# Patient Record
Sex: MALE | Race: WHITE | NOT HISPANIC OR LATINO | Employment: PART TIME | URBAN - METROPOLITAN AREA
[De-identification: names, ages, dates, MRNs, and addresses within clinical notes are randomized per-mention and may not be internally consistent; named-entity substitution may affect disease eponyms.]

---

## 2019-01-05 PROCEDURE — 87491 CHLMYD TRACH DNA AMP PROBE: CPT | Performed by: FAMILY MEDICINE

## 2019-01-05 PROCEDURE — 87591 N.GONORRHOEAE DNA AMP PROB: CPT | Performed by: FAMILY MEDICINE

## 2019-01-09 ENCOUNTER — TELEPHONE (OUTPATIENT)
Dept: URGENT CARE | Facility: CLINIC | Age: 19
End: 2019-01-09

## 2019-01-24 ENCOUNTER — OFFICE VISIT (OUTPATIENT)
Dept: FAMILY MEDICINE CLINIC | Facility: CLINIC | Age: 19
End: 2019-01-24

## 2019-01-24 VITALS
DIASTOLIC BLOOD PRESSURE: 62 MMHG | HEIGHT: 72 IN | HEART RATE: 63 BPM | BODY MASS INDEX: 22.02 KG/M2 | WEIGHT: 162.6 LBS | OXYGEN SATURATION: 98 % | TEMPERATURE: 97.1 F | SYSTOLIC BLOOD PRESSURE: 122 MMHG

## 2019-01-24 DIAGNOSIS — N50.812 LEFT TESTICULAR PAIN: Primary | ICD-10-CM

## 2019-01-24 DIAGNOSIS — Z00.00 HEALTHCARE MAINTENANCE: ICD-10-CM

## 2019-01-24 LAB
ALBUMIN SERPL-MCNC: 5.09 G/DL (ref 3.2–4.8)
ALBUMIN/GLOB SERPL: 2.1 G/DL (ref 1.5–2.5)
ALP SERPL-CCNC: 79 U/L (ref 25–100)
ALT SERPL W P-5'-P-CCNC: 19 U/L (ref 7–40)
ANION GAP SERPL CALCULATED.3IONS-SCNC: 8 MMOL/L (ref 3–11)
AST SERPL-CCNC: 8 U/L (ref 0–33)
BASOPHILS # BLD AUTO: 0.02 10*3/MM3 (ref 0–0.2)
BASOPHILS NFR BLD AUTO: 0.3 % (ref 0–1)
BILIRUB SERPL-MCNC: 0.7 MG/DL (ref 0.3–1.2)
BUN BLD-MCNC: 18 MG/DL (ref 9–23)
BUN/CREAT SERPL: 17.1 (ref 7–25)
CALCIUM SPEC-SCNC: 9.9 MG/DL (ref 8.7–10.4)
CHLORIDE SERPL-SCNC: 104 MMOL/L (ref 99–109)
CO2 SERPL-SCNC: 30 MMOL/L (ref 20–31)
CREAT BLD-MCNC: 1.05 MG/DL (ref 0.6–1.3)
DEPRECATED RDW RBC AUTO: 37.8 FL (ref 37–54)
EOSINOPHIL # BLD AUTO: 0.13 10*3/MM3 (ref 0–0.3)
EOSINOPHIL NFR BLD AUTO: 1.7 % (ref 0–3)
ERYTHROCYTE [DISTWIDTH] IN BLOOD BY AUTOMATED COUNT: 11.8 % (ref 11.3–14.5)
GFR SERPL CREATININE-BSD FRML MDRD: 91 ML/MIN/1.73
GLOBULIN UR ELPH-MCNC: 2.4 GM/DL
GLUCOSE BLD-MCNC: 90 MG/DL (ref 70–100)
HCT VFR BLD AUTO: 46.2 % (ref 38.9–50.9)
HGB BLD-MCNC: 16.4 G/DL (ref 13.1–17.5)
IMM GRANULOCYTES # BLD AUTO: 0.01 10*3/MM3 (ref 0–0.03)
IMM GRANULOCYTES NFR BLD AUTO: 0.1 % (ref 0–0.6)
LYMPHOCYTES # BLD AUTO: 2.37 10*3/MM3 (ref 0.6–4.8)
LYMPHOCYTES NFR BLD AUTO: 30.8 % (ref 24–44)
MCH RBC QN AUTO: 31.5 PG (ref 27–31)
MCHC RBC AUTO-ENTMCNC: 35.5 G/DL (ref 32–36)
MCV RBC AUTO: 88.7 FL (ref 80–99)
MONOCYTES # BLD AUTO: 0.57 10*3/MM3 (ref 0–1)
MONOCYTES NFR BLD AUTO: 7.4 % (ref 0–12)
NEUTROPHILS # BLD AUTO: 4.61 10*3/MM3 (ref 1.5–8.3)
NEUTROPHILS NFR BLD AUTO: 59.8 % (ref 41–71)
PLATELET # BLD AUTO: 316 10*3/MM3 (ref 150–450)
PMV BLD AUTO: 11 FL (ref 6–12)
POTASSIUM BLD-SCNC: 4 MMOL/L (ref 3.5–5.5)
PROT SERPL-MCNC: 7.5 G/DL (ref 5.7–8.2)
RBC # BLD AUTO: 5.21 10*6/MM3 (ref 4.2–5.76)
SODIUM BLD-SCNC: 142 MMOL/L (ref 132–146)
TSH SERPL DL<=0.05 MIU/L-ACNC: 1.15 MIU/ML (ref 0.35–5.35)
WBC NRBC COR # BLD: 7.7 10*3/MM3 (ref 4.5–13.5)

## 2019-01-24 PROCEDURE — 99203 OFFICE O/P NEW LOW 30 MIN: CPT | Performed by: NURSE PRACTITIONER

## 2019-01-24 PROCEDURE — 80050 GENERAL HEALTH PANEL: CPT | Performed by: NURSE PRACTITIONER

## 2019-01-24 NOTE — PATIENT INSTRUCTIONS
Testicular Self-Exam  A self-exam of your testicles (testicular self-exam) is looking at and feeling your testicles for unusual lumps or swelling. Swelling, lumps, or pain can be caused by:  · Injuries.  · Puffiness, redness, and soreness (inflammation).  · Infection.  · Extra fluids around your testicle (hydrocele).  · Twisted testicles (testicular torsion).  · Cancer of the testicle (testicular cancer).    Why is it important to do a self-exam of testicles?  You may need to do self-exams if you are at risk for cancer of the testicles. You may be at risk if you have:  · A testicle that has not descended (cryptorchidism).  · A history of cancer of the testicle.  · A family history of cancer of the testicle.    How to do a self-exam of testicles  It is easiest to do a self-exam after a warm bath or shower. Testicles are harder to examine when you are cold.  A normal testicle is egg-shaped and feels firm. It is smooth, and it is not tender. At the back of your testicles, there is a firm cord that feels like spaghetti (spermatic cord).  Look and feel for changes  · Stand and hold your penis away from your body.  · Look at each testicle to check for lumps or swelling.  · Roll each testicle between your thumb and finger. Feel the whole testicle. Feel for:  ? Lumps.  ? Swelling.  ? Discomfort.  · Check for swelling or tender bumps in the groin area. Your groin is where your lower belly (abdomen) meets your upper thighs.  Contact a health care provider if:  · You find a bump or lump. This may be like a small, hard bump that is the size of a pea.  · You find swelling.  · You find pain.  · You find soreness.  · You see or feel any other changes.  Summary  · A self-exam of your testicles is looking at and feeling your testicles for lumps or swelling.  · You may need to do self-exams if you are at risk for cancer of the testicle.  · You should check each of your testicles for lumps, swelling, or discomfort.  · You should check  for swelling or tender bumps in the groin area. Your groin is where your lower belly (abdomen) meets your upper thighs.  This information is not intended to replace advice given to you by your health care provider. Make sure you discuss any questions you have with your health care provider.  Document Released: 03/16/2010 Document Revised: 11/13/2017 Document Reviewed: 11/13/2017  Elsevier Interactive Patient Education © 2017 [employer]

## 2019-01-24 NOTE — PROGRESS NOTES
Subjective   Vamshi August is a 18 y.o. male here to establish care.  Chief Complaint   Patient presents with   • Establish Care   • Testicle Pain     Noticed around the first of January.       History of Present Illness   Patient is here with complaint of left testicular pain, was seen at  on 1/5/19 and 1/14/19, treated with antibiotics for possible STD. Pain has resolved, but when it started 1st of January, woke up one morning with discomfort, mild tenderness. States he does do self testicular exams, has not noticed any nodules. He is still concerned that something might be wrong. He denies intercourse in past 3 months, states he has been with same person for 3 years, uses condoms and does not think this was an STI. States no physical exam done at those visits.  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 Is from [place].   The following portions of the patient's history were reviewed and updated as appropriate: allergies, current medications, past family history, past medical history, past social history, past surgical history and problem list.    Review of Systems   Constitutional: Negative for activity change, appetite change, chills, fatigue and fever.   HENT: Negative for sore throat and trouble swallowing.    Eyes: Negative for photophobia, pain, redness, itching and visual disturbance.   Respiratory: Negative for cough, shortness of breath and wheezing.    Cardiovascular: Negative for chest pain, palpitations and leg swelling.   Gastrointestinal: Negative for abdominal distention, abdominal pain, blood in stool, constipation, diarrhea, nausea and vomiting.   Genitourinary: Positive for testicular pain. Negative for difficulty urinating, discharge and dysuria.   Musculoskeletal: Negative for arthralgias, back pain, myalgias, neck pain and neck stiffness.   Skin: Negative for rash and wound.   Neurological: Negative for dizziness and headaches.  "  Psychiatric/Behavioral: Negative for dysphoric mood. The patient is not nervous/anxious.      Blood pressure 122/62, pulse 63, temperature 97.1 °F (36.2 °C), temperature source Temporal, height 182 cm (71.65\"), weight 73.8 kg (162 lb 9.6 oz), SpO2 98 %.    No Known Allergies  History reviewed. No pertinent past medical history.  Past Surgical History:   Procedure Laterality Date   • DENTAL PROCEDURE       Family History   Problem Relation Age of Onset   • No Known Problems Mother    • No Known Problems Father    • No Known Problems Sister    • No Known Problems Brother      Social History     Socioeconomic History   • Marital status: Single     Spouse name: Not on file   • Number of children: Not on file   • Years of education: Not on file   • Highest education level: Not on file   Social Needs   • Financial resource strain: Not on file   • Food insecurity - worry: Not on file   • Food insecurity - inability: Not on file   • Transportation needs - medical: Not on file   • Transportation needs - non-medical: Not on file   Occupational History   • Not on file   Tobacco Use   • Smoking status: Never Smoker   • Smokeless tobacco: Never Used   Substance and Sexual Activity   • Alcohol use: Yes     Frequency: 2-4 times a month     Drinks per session: 3 or 4     Binge frequency: Never   • Drug use: No   • Sexual activity: Not Currently   Other Topics Concern   • Not on file   Social History Narrative   • Not on file       There is no immunization history on file for this patient.  No current outpatient medications on file.    Objective   Physical Exam   Constitutional: He is oriented to person, place, and time. He appears well-developed and well-nourished. No distress.   HENT:   Head: Normocephalic and atraumatic.   Right Ear: External ear normal.   Left Ear: External ear normal.   Eyes: Conjunctivae are normal. No scleral icterus.   Cardiovascular: Normal rate, regular rhythm, normal heart sounds and intact distal " pulses.   No murmur heard.  Pulmonary/Chest: Effort normal and breath sounds normal. No stridor. No respiratory distress. He has no wheezes. He has no rales.   Abdominal: Soft.   Genitourinary: Penis normal. Right testis shows no mass, no swelling and no tenderness. Right testis is descended. Left testis shows no mass, no swelling and no tenderness. Left testis is descended.   Musculoskeletal: He exhibits no edema or tenderness.   Lymphadenopathy:     He has no cervical adenopathy.   Neurological: He is alert and oriented to person, place, and time.   Skin: Skin is warm and dry. He is not diaphoretic.   Psychiatric: He has a normal mood and affect. His behavior is normal. Thought content normal.   Vitals reviewed.      Assessment/Plan   Vamshi was seen today for establish care and testicle pain.    Diagnoses and all orders for this visit:    Left testicular pain  -     US Scrotum & Testicles; Future  -     CBC Auto Differential; Future  -     CBC Auto Differential    Healthcare maintenance  -     CBC Auto Differential; Future  -     Comprehensive Metabolic Panel; Future  -     TSH; Future  -     CBC Auto Differential  -     Comprehensive Metabolic Panel  -     TSH      No orders of the defined types were placed in this encounter.    Physical exam was normal, will send for ultrasound to evaluate for possible hernia or other cause of pain since patient has concerns. Advised of reasons to present to the ED, including severe testicular pain or swelling.  Provided printed information today regarding safe sex practices and self testicular exams  Screening labs ordered to evaluate for infection and chronic conditions.  Patient was encouraged to keep me informed of any acute changes, lack of improvement, or any new concerning symptoms.Patient voiced understanding of all instructions and denied further questions.

## 2019-01-25 ENCOUNTER — HOSPITAL ENCOUNTER (OUTPATIENT)
Dept: ULTRASOUND IMAGING | Facility: HOSPITAL | Age: 19
Discharge: HOME | End: 2019-01-25
Admitting: NURSE PRACTITIONER

## 2019-01-25 DIAGNOSIS — N50.812 LEFT TESTICULAR PAIN: ICD-10-CM

## 2019-01-25 PROCEDURE — 76870 US EXAM SCROTUM: CPT

## 2019-01-30 ENCOUNTER — TELEPHONE (OUTPATIENT)
Dept: FAMILY MEDICINE CLINIC | Facility: CLINIC | Age: 19
End: 2019-01-30

## 2019-01-30 NOTE — TELEPHONE ENCOUNTER
Patient notified.     He was still very concerned and wants to know if you have any idea what causes this and if there are any recommendations to prevent this from happening again?

## 2019-01-30 NOTE — TELEPHONE ENCOUNTER
Let patient know this is fairly common, should resolve on it's own, not clear what causes it. Hydroceles and spermatoceles are usually painless and may not have anything to do with his pain, (that had resolved before his office visit) In general any young male who develops severe testicular pain should go to the ER, however this has nothing to do with the cysts. The concern in those cases would be torsion or twisting of the testicles. There is no sign of cancer and he should not be concerned about the US.

## 2019-01-30 NOTE — TELEPHONE ENCOUNTER
CHECKING ON THE RESULTS OF AN ULTRASOUND ON TESTICLES. THIS IS THIRD DAY SINCE TEST DONE. ULTRASOUND TECH ASKED THAT HE CALL US TODAY.    PLEASE ADVISE

## 2019-01-30 NOTE — TELEPHONE ENCOUNTER
See letter dated 1/27/19.    No concerning findings on your ultrasound. Hydroceles usually resolve on their own and are fluid filled cysts. If you develop swelling or pain please follow up or as we discussed if pain is severe go to the ED.   If you have any questions or concerns, please don't hesitate to call.

## 2019-02-08 ENCOUNTER — OFFICE VISIT (OUTPATIENT)
Dept: FAMILY MEDICINE CLINIC | Facility: CLINIC | Age: 19
End: 2019-02-08

## 2019-02-08 VITALS
HEART RATE: 84 BPM | OXYGEN SATURATION: 95 % | BODY MASS INDEX: 22.27 KG/M2 | DIASTOLIC BLOOD PRESSURE: 74 MMHG | TEMPERATURE: 98.3 F | WEIGHT: 162.6 LBS | SYSTOLIC BLOOD PRESSURE: 116 MMHG

## 2019-02-08 DIAGNOSIS — N50.819 TESTICLE PAIN: Primary | ICD-10-CM

## 2019-02-08 DIAGNOSIS — J30.2 SEASONAL ALLERGIES: ICD-10-CM

## 2019-02-08 DIAGNOSIS — R06.2 WHEEZING: ICD-10-CM

## 2019-02-08 DIAGNOSIS — J01.00 ACUTE NON-RECURRENT MAXILLARY SINUSITIS: ICD-10-CM

## 2019-02-08 PROCEDURE — 99214 OFFICE O/P EST MOD 30 MIN: CPT | Performed by: NURSE PRACTITIONER

## 2019-02-08 RX ORDER — PREDNISONE 20 MG/1
20 TABLET ORAL DAILY
Qty: 5 TABLET | Refills: 0 | Status: SHIPPED | OUTPATIENT
Start: 2019-02-08 | End: 2019-02-13

## 2019-02-08 RX ORDER — LORATADINE 10 MG/1
10 TABLET ORAL DAILY
Qty: 30 TABLET | Refills: 5 | Status: SHIPPED | OUTPATIENT
Start: 2019-02-08 | End: 2019-10-09

## 2019-02-08 RX ORDER — AMOXICILLIN AND CLAVULANATE POTASSIUM 875; 125 MG/1; MG/1
1 TABLET, FILM COATED ORAL 2 TIMES DAILY
Qty: 14 TABLET | Refills: 0 | Status: SHIPPED | OUTPATIENT
Start: 2019-02-08 | End: 2019-02-15

## 2019-02-08 RX ORDER — FLUTICASONE PROPIONATE 50 MCG
2 SPRAY, SUSPENSION (ML) NASAL DAILY
Qty: 1 BOTTLE | Refills: 5 | Status: SHIPPED | OUTPATIENT
Start: 2019-02-08 | End: 2019-10-09

## 2019-02-08 NOTE — PROGRESS NOTES
"Subjective   Vamshi August is a 18 y.o. male.   Chief Complaint   Patient presents with   • URI     Started Wednesday   • Testicle Pain     Follow-up (pain goes away with motrin)       History of Present Illness   Patient is here for follow up for testicular pain, had normal US, still occasional pain, resolved with motrin, no swelling, occ. \"a little redness\".   Has new complaint of URI, sore throat started Tuesday, has gotten worse, congestion started Wednesday, woke up this morning with right ear pain, pressure and decreased hearing. States in February allergies cause problems.   The following portions of the patient's history were reviewed and updated as appropriate: allergies and current medications.    Review of Systems   Constitutional: Negative for chills and fever.   HENT: Positive for congestion, ear pain, hearing loss (right ear this morning), postnasal drip, rhinorrhea, sinus pressure, sore throat and voice change. Negative for sneezing and trouble swallowing.    Eyes: Positive for itching.   Respiratory: Positive for cough.    Allergic/Immunologic: Positive for environmental allergies.   Neurological: Negative for dizziness and headaches.       Objective   Physical Exam   Constitutional: He is oriented to person, place, and time. He appears well-developed and well-nourished. No distress.   HENT:   Head: Normocephalic and atraumatic.   Right Ear: External ear normal. Tympanic membrane is not erythematous and not bulging. A middle ear effusion is present.   Left Ear: External ear normal. Tympanic membrane is not erythematous and not bulging. A middle ear effusion is present.   Nose: Mucosal edema present. Right sinus exhibits maxillary sinus tenderness. Right sinus exhibits no frontal sinus tenderness. Left sinus exhibits maxillary sinus tenderness. Left sinus exhibits no frontal sinus tenderness.   Mouth/Throat: No oropharyngeal exudate.   Eyes: Conjunctivae are normal. No scleral icterus. "   Cardiovascular: Normal rate and regular rhythm.   No murmur heard.  Pulmonary/Chest: Effort normal. No stridor. No respiratory distress. He has wheezes (left lower lobe). He has no rales.   Genitourinary:   Genitourinary Comments: deferred   Musculoskeletal: He exhibits no edema or tenderness.   Lymphadenopathy:     He has no cervical adenopathy.   Neurological: He is alert and oriented to person, place, and time.   Skin: Skin is warm. Capillary refill takes less than 2 seconds. He is not diaphoretic.   Psychiatric: He has a normal mood and affect. His behavior is normal.   Vitals reviewed.      Assessment/Plan   Vamshi was seen today for uri and testicle pain.    Diagnoses and all orders for this visit:    Testicle pain    Wheezing  -     predniSONE (DELTASONE) 20 MG tablet; Take 1 tablet by mouth Daily for 5 days.    Acute non-recurrent maxillary sinusitis  -     amoxicillin-clavulanate (AUGMENTIN) 875-125 MG per tablet; Take 1 tablet by mouth 2 (Two) Times a Day for 7 days.  -     fluticasone (FLONASE ALLERGY RELIEF) 50 MCG/ACT nasal spray; 2 sprays into the nostril(s) as directed by provider Daily.    Seasonal allergies  -     loratadine (CLARITIN) 10 MG tablet; Take 1 tablet by mouth Daily.  -     fluticasone (FLONASE ALLERGY RELIEF) 50 MCG/ACT nasal spray; 2 sprays into the nostril(s) as directed by provider Daily.      Testicular pain has resolved, provided printed information for spermatoceles.  Prednisone prescribed for wheezing  Augmentin prescribed for sinus infection.  Advised daily antihistamine such as Claritin or Zyrtec, along with daily Flonase to reduce drainage and allergy symptoms.  Advised patient to start this preventative treatment every year at least one month before symptoms normally occur to reduce risk of sinus infections.  Patient was encouraged to keep me informed of any acute changes, lack of improvement, or any new concerning symptoms.  Patient voiced understanding of all  instructions and denied further questions.

## 2019-02-08 NOTE — PATIENT INSTRUCTIONS
Allergic Rhinitis, Adult  Allergic rhinitis is an allergic reaction that affects the mucous membrane inside the nose. It causes sneezing, a runny or stuffy nose, and the feeling of mucus going down the back of the throat (postnasal drip). Allergic rhinitis can be mild to severe.  There are two types of allergic rhinitis:  · Seasonal. This type is also called hay fever. It happens only during certain seasons.  · Perennial. This type can happen at any time of the year.    What are the causes?  This condition happens when the body's defense system (immune system) responds to certain harmless substances called allergens as though they were germs.   Seasonal allergic rhinitis is triggered by pollen, which can come from grasses, trees, and weeds. Perennial allergic rhinitis may be caused by:  · House dust mites.  · Pet dander.  · Mold spores.    What are the signs or symptoms?  Symptoms of this condition include:  · Sneezing.  · Runny or stuffy nose (nasal congestion).  · Postnasal drip.  · Itchy nose.  · Tearing of the eyes.  · Trouble sleeping.  · Daytime sleepiness.    How is this diagnosed?  This condition may be diagnosed based on:  · Your medical history.  · A physical exam.  · Tests to check for related conditions, such as:  ? Asthma.  ? Pink eye.  ? Ear infection.  ? Upper respiratory infection.  · Tests to find out which allergens trigger your symptoms. These may include skin or blood tests.    How is this treated?  There is no cure for this condition, but treatment can help control symptoms. Treatment may include:  · Taking medicines that block allergy symptoms, such as antihistamines. Medicine may be given as a shot, nasal spray, or pill.  · Avoiding the allergen.  · Desensitization. This treatment involves getting ongoing shots until your body becomes less sensitive to the allergen. This treatment may be done if other treatments do not help.  · If taking medicine and avoiding the allergen does not work, new,  stronger medicines may be prescribed.    Follow these instructions at home:  · Find out what you are allergic to. Common allergens include smoke, dust, and pollen.  · Avoid the things you are allergic to. These are some things you can do to help avoid allergens:  ? Replace carpet with wood, tile, or vinyl sj. Carpet can trap dander and dust.  ? Do not smoke. Do not allow smoking in your home.  ? Change your heating and air conditioning filter at least once a month.  ? During allergy season:  § Keep windows closed as much as possible.  § Plan outdoor activities when pollen counts are lowest. This is usually during the evening hours.  § When coming indoors, change clothing and shower before sitting on furniture or bedding.  · Take over-the-counter and prescription medicines only as told by your health care provider.  · Keep all follow-up visits as told by your health care provider. This is important.  Contact a health care provider if:  · You have a fever.  · You develop a persistent cough.  · You make whistling sounds when you breathe (you wheeze).  · Your symptoms interfere with your normal daily activities.  Get help right away if:  · You have shortness of breath.  Summary  · This condition can be managed by taking medicines as directed and avoiding allergens.  · Contact your health care provider if you develop a persistent cough or fever.  · During allergy season, keep windows closed as much as possible.  This information is not intended to replace advice given to you by your health care provider. Make sure you discuss any questions you have with your health care provider.  Document Released: 09/12/2002 Document Revised: 01/25/2018 Document Reviewed: 01/25/2018  Elsevier Interactive Patient Education © 2018 [employer]    Sinusitis, Adult  Sinusitis is soreness and inflammation of your sinuses. Sinuses are hollow spaces in the bones around your face. They are located:  · Around your eyes.  · In the middle  of your forehead.  · Behind your nose.  · In your cheekbones.    Your sinuses and nasal passages are lined with a stringy fluid (mucus). Mucus normally drains out of your sinuses. When your nasal tissues get inflamed or swollen, the mucus can get trapped or blocked so air cannot flow through your sinuses. This lets bacteria, viruses, and funguses grow, and that leads to infection.  Follow these instructions at home:  Medicines  · Take, use, or apply over-the-counter and prescription medicines only as told by your doctor. These may include nasal sprays.  · If you were prescribed an antibiotic medicine, take it as told by your doctor. Do not stop taking the antibiotic even if you start to feel better.  Hydrate and Humidify  · Drink enough water to keep your pee (urine) clear or pale yellow.  · Use a cool mist humidifier to keep the humidity level in your home above 50%.  · Breathe in steam for 10-15 minutes, 3-4 times a day or as told by your doctor. You can do this in the bathroom while a hot shower is running.  · Try not to spend time in cool or dry air.  Rest  · Rest as much as possible.  · Sleep with your head raised (elevated).  · Make sure to get enough sleep each night.  General instructions  · Put a warm, moist washcloth on your face 3-4 times a day or as told by your doctor. This will help with discomfort.  · Wash your hands often with soap and water. If there is no soap and water, use hand .  · Do not smoke. Avoid being around people who are smoking (secondhand smoke).  · Keep all follow-up visits as told by your doctor. This is important.  Contact a doctor if:  · You have a fever.  · Your symptoms get worse.  · Your symptoms do not get better within 10 days.  Get help right away if:  · You have a very bad headache.  · You cannot stop throwing up (vomiting).  · You have pain or swelling around your face or eyes.  · You have trouble seeing.  · You feel confused.  · Your neck is stiff.  · You have  trouble breathing.  This information is not intended to replace advice given to you by your health care provider. Make sure you discuss any questions you have with your health care provider.  Document Released: 06/05/2009 Document Revised: 08/13/2017 Document Reviewed: 10/12/2016  [employer] Interactive Patient Education © 2018 [employer]    Spermatocele  A spermatocele is a fluid-filled sac (cyst) inside the scrotum. This type of cyst often forms at the top of the testicle where sperm is stored (epididymis). The cyst sometimes forms along the tube that carries sperm away from the epididymis (vas deferens).  Spermatoceles are usually painless. Most cysts are small, but they can grow larger. Spermatoceles are not cancerous (are benign).  What are the causes?  The cause of this condition is not known.  What are the signs or symptoms?  In most cases, small cysts do not cause symptoms. If you do have symptoms, they may include:  · Dull pain.  · A feeling of heaviness.  · An enlargement of your scrotum, if the cyst is large.    How is this diagnosed?  This condition is diagnosed based on a physical exam. You or your health care provider may notice the cyst when feeling your scrotum. Your provider may shine a light through (transilluminate) your scrotum to see if light will pass through the cyst. You may also have an ultrasound of your scrotum to rule out a tumor.  How is this treated?  Small spermatoceles do not need to be treated. If the spermatocele has grown large or is uncomfortable, surgery to remove the cyst may be recommended.  Follow these instructions at home:  · Watch your spermatocele for any changes.  · Keep all follow-up visits as told by your health care provider. This is important.  Contact a health care provider if:  · Your spermatocele gets larger.  · You have pain in your scrotum.  · Your spermatocele comes back after treatment.  This information is not intended to replace advice given to you by your  health care provider. Make sure you discuss any questions you have with your health care provider.  Document Released: 04/10/2017 Document Revised: 08/14/2017 Document Reviewed: 01/01/2017  [employer] Interactive Patient Education © 2018 [employer]

## 2019-10-08 ENCOUNTER — OFFICE VISIT (OUTPATIENT)
Dept: FAMILY MEDICINE CLINIC | Facility: CLINIC | Age: 19
End: 2019-10-08

## 2019-10-08 DIAGNOSIS — N50.89 TESTICULAR LUMP: Primary | ICD-10-CM

## 2019-10-08 DIAGNOSIS — N43.40 SPERMATOCELE OF EPIDIDYMIS: ICD-10-CM

## 2019-10-08 PROCEDURE — 99213 OFFICE O/P EST LOW 20 MIN: CPT | Performed by: NURSE PRACTITIONER

## 2019-10-08 NOTE — PATIENT INSTRUCTIONS
Spermatocele    A spermatocele is a fluid-filled sac (cyst) inside the scrotum. This type of cyst often forms at the top of the testicle where sperm is stored (epididymis). The cyst sometimes forms along the tube that carries sperm away from the epididymis (vas deferens).  Spermatoceles are usually painless. Most cysts are small, but they can grow larger. Spermatoceles are not cancerous (are benign).  What are the causes?  The cause of this condition is not known.  What are the signs or symptoms?  In most cases, small cysts do not cause symptoms. If you do have symptoms, they may include:  · Dull pain.  · A feeling of heaviness.  · An enlargement of your scrotum, if the cyst is large.  How is this diagnosed?  This condition is diagnosed based on a physical exam. You or your health care provider may notice the cyst when feeling your scrotum. Your provider may shine a light through (transilluminate) your scrotum to see if light will pass through the cyst. You may also have an ultrasound of your scrotum to rule out a tumor.  How is this treated?  Small spermatoceles do not need to be treated. If the spermatocele has grown large or is uncomfortable, surgery to remove the cyst may be recommended.  Follow these instructions at home:  · Watch your spermatocele for any changes.  · Keep all follow-up visits as told by your health care provider. This is important.  Contact a health care provider if:  · Your spermatocele gets larger.  · You have pain in your scrotum.  · Your spermatocele comes back after treatment.  This information is not intended to replace advice given to you by your health care provider. Make sure you discuss any questions you have with your health care provider.  Document Released: 04/10/2017 Document Revised: 08/14/2017 Document Reviewed: 01/01/2017  [employer] Interactive Patient Education © 2019 [employer]    Scrotal Swelling  Scrotal swelling refers to a condition in which the sac of skin that  contains the testes (scrotum) is enlarged or swollen. Many things can cause the scrotum to enlarge or swell, including:  · Fluid around the testicle (hydrocele).  · A weakened area in the muscles around the groin (hernia).  · An enlarged vein around the testicle (varicocele).  · An injury.  · An infection.  · Certain medical treatments.  · Certain medical conditions, such as congestive heart failure.  · A recent genital surgery or procedure.  · A twisting of the spermatic cord that cuts off blood supply (testicular torsion).  · Testicular cancer.  Scrotal swelling can happen along with scrotal pain.  Follow these instructions at home:  · Until the swelling goes away:  ? Rest. The best position to rest in is to lie down.  ? Limit activity.  · Put ice on the scrotum:  ? Put ice in a plastic bag.  ? Place a towel between your skin and the bag.  ? Leave the ice on for 20 minutes, 2-3 times a day for 1-2 days.  · Place a rolled towel under your testicles for support.  · Wear loose-fitting clothing or an athletic support cup for comfort.  · Take over-the-counter and prescription medicines only as told by your health care provider.  · Perform a monthly self-exam of the scrotum and penis. Feel for changes. Ask your health care provider how to perform a monthly self-exam if you are unsure.  Contact a health care provider if:  · You have a sudden pain that is persistent and does not improve.  · You have a heavy feeling or notice fluid in the scrotum.  · You have pain or burning while urinating.  · You have blood in your urine or semen.  · You feel a lump around the testicle.  · You notice that one testicle is larger than the other. Keep in mind that a small difference in size is normal.  · You have a persistent dull ache or pain in your groin or scrotum.  Get help right away if:  · The pain does not go away.  · The pain becomes severe.  · You have a fever or chills.  · You have pain or vomiting that cannot be  controlled.  · One or both sides of the scrotum are very red and swollen.  · There is redness spreading upward from your scrotum to your abdomen or downward from your scrotum to your thighs.  Summary  · Scrotal swelling refers to a condition in which the sac of skin that contains the testes (scrotum) is enlarged.  · Many things can cause the scrotum to swell, including hydrocele, a hernia, and a varicocele.  · Limiting activity and icing the scrotum may help reduce swelling and pain.  · Contact your health care provider if you develop scrotal pain that is sudden and persistent, or if you have pain while urinating. Do this also if you feel a lump around the testicle or notice blood in your urine or semen.  · Get help right away for uncontrolled pain or vomiting, for very red and swollen scrotum, or for fever or chills.  This information is not intended to replace advice given to you by your health care provider. Make sure you discuss any questions you have with your health care provider.  Document Released: 01/20/2012 Document Revised: 03/05/2018 Document Reviewed: 03/05/2018  [employer] Interactive Patient Education © 2019 [employer]    Preventing Sexually Transmitted Infections, Adult  Sexually transmitted infections (STIs) are diseases that are passed (transmitted) from person to person through bodily fluids exchanged during sex or sexual contact. Bodily fluids include saliva, semen, blood, vaginal mucus, and urine. You may have an increased risk for developing an STI if you have unprotected oral, vaginal, or anal sex.  Some common STIs include:  · Herpes.  · Hepatitis B.  · Chlamydia.  · Gonorrhea.  · Syphilis.  · HPV (human papillomavirus).  · HIV (humanimmunodeficiency virus), the virus that can cause AIDS (acquired immunodeficiency virus).  How can I protect myself from sexually transmitted infections?  The only way to completely prevent STIs is not to have sex of any kind (practice abstinence). This includes  oral, vaginal, or anal sex. If you are sexually active, take these actions to lower your risk of getting an STI:  · Have only one sex partner (be monogamous) or limit the number of sexual partners you have.  · Stay up-to-date on immunizations. Certain vaccines can lower your risk of getting certain STIs, such as:  ? Hepatitis A and B vaccines. You may have been vaccinated as a young child, but likely need a booster shot as a teen or young adult.  ? HPV vaccine. This vaccine is recommended if you are a man under age 22 or a woman under age 27.  · Use methods that prevent the exchange of body fluids between partners (barrier protection) every time you have sex. Barrier protection can be used during oral, vaginal, or anal sex. Commonly used barrier methods include:  ? Male condom.  ? Female condom.  ? Dental dam.  · Get tested regularly for STIs. Have your sexual partner get tested regularly as well.  · Avoid mixing alcohol, drugs, and sex. Alcohol and drug use can affect your ability to make good decisions and can lead to risky sexual behaviors.  · Ask your health care provider about taking pre-exposure prophylaxis (PrEP) to prevent HIV infection if you:  ? Have a HIV-positive sexual partner.  ? Have multiple sexual partners or partners who do not know their HIV status, and do not regularly use a condom during sex.  ? Use injection drugs and share needles.  Birth control pills, injections, implants, and intrauterine devices (IUDs) do not protect against STIs. To prevent both STIs and pregnancy, always use a condom with another form of birth control.  Some STIs, such as herpes, are spread through skin to skin contact. A condom does not protect you from getting such STIs. If you or your partner have herpes and there is an active flare with open sores, avoid all sexual contact.  Why are these changes important?  Taking steps to practice safe sex protects you and others. Many STIs can be cured. However, some STIs are not  curable and will affect you for the rest of your life. STIs can be passed on to another person even if you do not have symptoms.  What can happen if changes are not made?  Certain STIs may:  · Require you to take medicine for the rest of your life.  · Affect your ability to have children (your fertility).  · Increase your risk for developing another STI or certain serious health conditions, such as:  ? Cervical cancer.  ? Head and neck cancer.  ? Pelvic inflammatory disease (PID) in women.  ? Organ damage or damage to other parts of your body, if the infection spreads.  · Be passed to a baby during childbirth.  How are sexually transmitted infections treated?  If you or your partner know or think that you may have an STI:  · Talk with your healthcare provider about what can be done to treat it. Some STIs can be treated and cured with medicines.  · For curable STIs, you and your partner should avoid sex during treatment and for several days after treatment is complete.  · You and your partner should both be treated at the same time, if there is any chance that your partner is infected as well. If you get treatment but your partner does not, your partner can re-infect you when you resume sexual contact.  · Do not have unprotected sex.  Where to find more information  Learn more about sexually transmitted diseases and infections from:  · Centers for Disease Control and Prevention:  ? More information about specific STIs: www.cdc.gov/std  ? Find places to get sexual health counseling and treatment for free or for a low cost: gettested.cdc.gov  · [place]. Department of Health and Human Services: www.womenshealth.gov/publications/our-publications/fact-sheet/sexually-transmitted-infections.html  Summary  · The only way to completely prevent STIs is not to have sex (practice abstinence), including oral, vaginal, or anal sex.  · STIs can spread through saliva, semen, blood, vaginal mucus, urine, or sexual contact.  · If you do  have sex, limit your number of sexual partners and use a barrier protection method every time you have sex.  · If you develop an STI, get treated right away and ask your partner to be treated as well. Do not resume having sex until both of you have completed treatment for the STI.  This information is not intended to replace advice given to you by your health care provider. Make sure you discuss any questions you have with your health care provider.  Document Released: 12/14/2017 Document Revised: 12/14/2017 Document Reviewed: 12/14/2017  [employer] Interactive Patient Education © 2019 [employer]    Safe Sex  Practicing safe sex means taking steps before and during sex to reduce your risk of:  · Getting an STD (sexually transmitted disease).  · Giving your partner an STD.  · Unwanted pregnancy.  How can I practice safe sex?  To practice safe sex:  · Limit your sexual partners to only one partner who is having sex with only you.  · Avoid using alcohol and recreational drugs before having sex. These substances can affect your judgment.  · Before having sex with a new partner:  ? Talk to your partner about past partners, past STDs, and drug use.  ? You and your partner should be screened for STDs and discuss the results with each other.  · Check your body regularly for sores, blisters, rashes, or unusual discharge. If you notice any of these problems, visit your health care provider.  · If you have symptoms of an infection or you are being treated for an STD, avoid sexual contact.  · While having sex, use a condom. Make sure to:  ? Use a condom every time you have vaginal, oral, or anal sex. Both females and males should wear condoms during oral sex.  ? Keep condoms in place from the beginning to the end of sexual activity.  ? Use a latex condom, if possible. Latex condoms offer the best protection.  ? Use only water-based lubricants or oils to lubricate a condom. Using petroleum-based lubricants or oils will weaken  the condom and increase the chance that it will break.  · See your health care provider for regular screenings, exams, and tests for STDs.  · Talk with your health care provider about the form of birth control (contraception) that is best for you.  · Get vaccinated against hepatitis B and human papillomavirus (HPV).  · If you are at risk of being infected with HIV (human immunodeficiency virus), talk with your health care provider about taking a prescription medicine to prevent HIV infection. You are considered at risk for HIV if:  ? You are a man who has sex with other men.  ? You are a heterosexual man or woman who is sexually active with more than one partner.  ? You take drugs by injection.  ? You are sexually active with a partner who has HIV.  This information is not intended to replace advice given to you by your health care provider. Make sure you discuss any questions you have with your health care provider.  Document Released: 01/25/2006 Document Revised: 05/03/2017 Document Reviewed: 11/06/2016  [employer] Interactive Patient Education © 2019 [employer]

## 2019-10-08 NOTE — PROGRESS NOTES
Subjective   Vamshi August is a 18 y.o. male.   Chief Complaint   Patient presents with   • Lump on left testicle     Tightness in left side of lower abdomen       History of Present Illness   Patient is here with complaint of a lump on left testicle, lump not painful, but pain in left scrotum. No change in size since first noticed 2 weeks ago., some improvement in scrotal pain. Denies unprotected sex. He was seen in Feb 2019, for left testicular pain without nodule, had US that showed a spermatocele. Pain resolved without treatment. Negative STI screening.  The following portions of the patient's history were reviewed and updated as appropriate: allergies, current medications, past social history and problem list.    Review of Systems   Constitutional: Negative for chills and fever.   Gastrointestinal: Negative for abdominal pain, constipation and diarrhea.   Genitourinary: Positive for scrotal swelling and testicular pain. Negative for difficulty urinating, discharge, dysuria and genital sores.       Objective   Physical Exam   Constitutional: He is oriented to person, place, and time. He appears well-developed and well-nourished. No distress.   HENT:   Head: Normocephalic and atraumatic.   Right Ear: External ear normal.   Left Ear: External ear normal.   Mouth/Throat: No oropharyngeal exudate.   Eyes: Conjunctivae are normal. No scleral icterus.   Cardiovascular: Normal rate, regular rhythm and normal heart sounds.   No murmur heard.  Pulmonary/Chest: Effort normal and breath sounds normal. No stridor. No respiratory distress. He has no wheezes.   Abdominal: Soft. Bowel sounds are normal. He exhibits no distension. There is no tenderness. There is no guarding.   Genitourinary: Penis normal. Right testis shows no mass, no swelling and no tenderness. Right testis is descended. Left testis shows mass. Left testis shows no swelling and no tenderness. Left testis is descended. Circumcised.    Neurological: He is alert and oriented to person, place, and time.   Skin: Skin is warm and dry. He is not diaphoretic.   Psychiatric: He has a normal mood and affect. His behavior is normal. Judgment and thought content normal.   Vitals reviewed.      Assessment/Plan   Vamshi was seen today for lump on left testicle.    Diagnoses and all orders for this visit:    Testicular lump  -     Ambulatory Referral to Urology  -     US Scrotum & Testicles; Future    Spermatocele of epididymis        Referred to urology for evaluation of spermatocele and testicular nodule. US ordered. Provided safe sex information, advised use of condoms. Patient advised to seek emergent care if he develops worsening testicular pain or swelling.  Patient was encouraged to keep me informed of any acute changes, lack of improvement, or any new concerning symptoms.  Patient voiced understanding of all instructions and denied further questions.

## 2019-10-09 VITALS
OXYGEN SATURATION: 99 % | HEART RATE: 56 BPM | DIASTOLIC BLOOD PRESSURE: 68 MMHG | BODY MASS INDEX: 23.84 KG/M2 | SYSTOLIC BLOOD PRESSURE: 120 MMHG | HEIGHT: 72 IN | WEIGHT: 176 LBS

## 2019-10-18 ENCOUNTER — APPOINTMENT (OUTPATIENT)
Dept: ULTRASOUND IMAGING | Facility: HOSPITAL | Age: 19
End: 2019-10-18

## 2019-10-22 ENCOUNTER — HOSPITAL ENCOUNTER (OUTPATIENT)
Dept: ULTRASOUND IMAGING | Facility: HOSPITAL | Age: 19
Discharge: HOME | End: 2019-10-22
Admitting: NURSE PRACTITIONER

## 2019-10-22 DIAGNOSIS — N50.89 TESTICULAR LUMP: ICD-10-CM

## 2019-10-22 PROCEDURE — 76870 US EXAM SCROTUM: CPT

## 2019-11-07 ENCOUNTER — OFFICE VISIT (OUTPATIENT)
Dept: FAMILY MEDICINE CLINIC | Facility: CLINIC | Age: 19
End: 2019-11-07

## 2019-11-07 VITALS
SYSTOLIC BLOOD PRESSURE: 108 MMHG | OXYGEN SATURATION: 98 % | HEIGHT: 72 IN | DIASTOLIC BLOOD PRESSURE: 80 MMHG | BODY MASS INDEX: 23.16 KG/M2 | WEIGHT: 171 LBS | HEART RATE: 67 BPM

## 2019-11-07 DIAGNOSIS — N50.89 TESTICULAR NODULE: Primary | ICD-10-CM

## 2019-11-07 DIAGNOSIS — N43.3 LEFT HYDROCELE: ICD-10-CM

## 2019-11-07 DIAGNOSIS — J00 ACUTE NASOPHARYNGITIS: ICD-10-CM

## 2019-11-07 PROCEDURE — 99212 OFFICE O/P EST SF 10 MIN: CPT | Performed by: NURSE PRACTITIONER

## 2019-11-07 NOTE — PROGRESS NOTES
Subjective   Vamshi August is a 18 y.o. male.   Chief Complaint   Patient presents with   • Testicle Pain     follow-up feeling better       History of Present Illness   Patient is here for follow up for testicular lump and pain, states nodule has decreased in size, denies any pain. This was a recurrence, was treated with azithromycin and rocephin in March, 2019 He has an appt with a urologist in [place] today, wants to change that to a Dr. In [place].  States he has a cold now x 2 days, so did feel feverish yesterday. OTC meds are controlling symptoms.  The following portions of the patient's history were reviewed and updated as appropriate: allergies, current medications, past family history, past medical history, past social history, past surgical history and problem list.    Review of Systems   Constitutional: Positive for fever (r/t cold/URI). Negative for chills and fatigue.   HENT: Positive for congestion. Negative for ear pain.    Respiratory: Negative for shortness of breath.    Cardiovascular: Negative for chest pain.   Genitourinary: Negative for difficulty urinating, discharge, dysuria, frequency, hematuria, scrotal swelling and testicular pain.       Objective   Physical Exam   Constitutional: He appears well-developed and well-nourished. No distress.   HENT:   Head: Normocephalic and atraumatic.   Buildup of cerumen in right ear   Genitourinary:   Genitourinary Comments: Did not do exam this time, deferred to urology   Skin: He is not diaphoretic.   Vitals reviewed.      Assessment/Plan   Vamshi was seen today for testicle pain.    Diagnoses and all orders for this visit:    Testicular nodule    Left hydrocele    Acute nasopharyngitis        Spoke to Lucie who will work on getting appt with new urologist in [place]. Patient did not want to drive to [place].  Advised daily antihistamine and Flonase, continue OTC cold meds.  Debrox for earwax removal  Patient was encouraged to keep me  informed of any acute changes, lack of improvement, or any new concerning symptoms.

## 2019-12-09 PROCEDURE — 87591 N.GONORRHOEAE DNA AMP PROB: CPT | Performed by: FAMILY MEDICINE

## 2019-12-09 PROCEDURE — 87661 TRICHOMONAS VAGINALIS AMPLIF: CPT | Performed by: FAMILY MEDICINE

## 2019-12-09 PROCEDURE — 87491 CHLMYD TRACH DNA AMP PROBE: CPT | Performed by: FAMILY MEDICINE

## 2019-12-14 ENCOUNTER — TELEPHONE (OUTPATIENT)
Dept: URGENT CARE | Facility: CLINIC | Age: 19
End: 2019-12-14

## 2019-12-14 NOTE — TELEPHONE ENCOUNTER
Notified Mr. August of positive chlamydia, was treated with azithromycin in office, states symptoms have resolved. No unprotected sex x 1 week after both partners are treated. Notify all partners of positive results. If symptoms persist follow up with PCP.

## 2019-12-15 ENCOUNTER — APPOINTMENT (OUTPATIENT)
Dept: CARDIOLOGY | Facility: HOSPITAL | Age: 19
End: 2019-12-15

## 2019-12-15 ENCOUNTER — APPOINTMENT (OUTPATIENT)
Dept: GENERAL RADIOLOGY | Facility: HOSPITAL | Age: 19
End: 2019-12-15

## 2019-12-15 ENCOUNTER — HOSPITAL ENCOUNTER (INPATIENT)
Facility: HOSPITAL | Age: 19
LOS: 5 days | Discharge: HOME | End: 2019-12-20
Attending: EMERGENCY MEDICINE | Admitting: INTERNAL MEDICINE

## 2019-12-15 DIAGNOSIS — Z87.09 HISTORY OF SORE THROAT: ICD-10-CM

## 2019-12-15 DIAGNOSIS — I30.9 ACUTE PERICARDITIS, UNSPECIFIED TYPE: Primary | ICD-10-CM

## 2019-12-15 DIAGNOSIS — Z86.19 HISTORY OF CHLAMYDIA INFECTION: ICD-10-CM

## 2019-12-15 DIAGNOSIS — I40.9 ACUTE MYOCARDITIS, UNSPECIFIED MYOCARDITIS TYPE: ICD-10-CM

## 2019-12-15 LAB
ALBUMIN SERPL-MCNC: 3.6 G/DL (ref 3.5–5.2)
ALBUMIN/GLOB SERPL: 1.1 G/DL
ALP SERPL-CCNC: 130 U/L (ref 39–117)
ALT SERPL W P-5'-P-CCNC: 25 U/L (ref 1–41)
ANION GAP SERPL CALCULATED.3IONS-SCNC: 13 MMOL/L (ref 5–15)
AST SERPL-CCNC: 48 U/L (ref 1–40)
BASOPHILS # BLD AUTO: 0.04 10*3/MM3 (ref 0–0.2)
BASOPHILS NFR BLD AUTO: 0.5 % (ref 0–1.5)
BH CV ECHO MEAS - AO ROOT AREA (BSA CORRECTED): 1.3
BH CV ECHO MEAS - AO ROOT AREA: 5.3 CM^2
BH CV ECHO MEAS - AO ROOT DIAM: 2.6 CM
BH CV ECHO MEAS - BSA(HAYCOCK): 2 M^2
BH CV ECHO MEAS - BSA: 2 M^2
BH CV ECHO MEAS - BZI_BMI: 23.7 KILOGRAMS/M^2
BH CV ECHO MEAS - BZI_METRIC_HEIGHT: 182.9 CM
BH CV ECHO MEAS - BZI_METRIC_WEIGHT: 79.4 KG
BH CV ECHO MEAS - EDV(CUBED): 109.2 ML
BH CV ECHO MEAS - EDV(TEICH): 106.5 ML
BH CV ECHO MEAS - EF(CUBED): 66.8 %
BH CV ECHO MEAS - EF(TEICH): 58.2 %
BH CV ECHO MEAS - ESV(CUBED): 36.3 ML
BH CV ECHO MEAS - ESV(TEICH): 44.5 ML
BH CV ECHO MEAS - FS: 30.8 %
BH CV ECHO MEAS - IVS/LVPW: 0.96
BH CV ECHO MEAS - IVSD: 0.91 CM
BH CV ECHO MEAS - LA DIMENSION: 3.5 CM
BH CV ECHO MEAS - LA/AO: 1.3
BH CV ECHO MEAS - LAD MAJOR: 5.4 CM
BH CV ECHO MEAS - LAT PEAK E' VEL: 16 CM/SEC
BH CV ECHO MEAS - LATERAL E/E' RATIO: 5.1
BH CV ECHO MEAS - LV MASS(C)D: 153.3 GRAMS
BH CV ECHO MEAS - LV MASS(C)DI: 76.2 GRAMS/M^2
BH CV ECHO MEAS - LVIDD: 4.8 CM
BH CV ECHO MEAS - LVIDS: 3.3 CM
BH CV ECHO MEAS - LVPWD: 0.95 CM
BH CV ECHO MEAS - MED PEAK E' VEL: 17 CM/SEC
BH CV ECHO MEAS - MEDIAL E/E' RATIO: 4.8
BH CV ECHO MEAS - MV A MAX VEL: 25.2 CM/SEC
BH CV ECHO MEAS - MV DEC SLOPE: 254.5 CM/SEC^2
BH CV ECHO MEAS - MV DEC TIME: 0.19 SEC
BH CV ECHO MEAS - MV E MAX VEL: 80.9 CM/SEC
BH CV ECHO MEAS - MV E/A: 3.2
BH CV ECHO MEAS - MV P1/2T MAX VEL: 96.5 CM/SEC
BH CV ECHO MEAS - MV P1/2T: 111.1 MSEC
BH CV ECHO MEAS - MVA P1/2T LCG: 2.3 CM^2
BH CV ECHO MEAS - MVA(P1/2T): 2 CM^2
BH CV ECHO MEAS - PA ACC SLOPE: 417 CM/SEC^2
BH CV ECHO MEAS - PA ACC TIME: 0.15 SEC
BH CV ECHO MEAS - PA PR(ACCEL): 11.1 MMHG
BH CV ECHO MEAS - PI END-D VEL: 90.3 CM/SEC
BH CV ECHO MEAS - RAP SYSTOLE: 3 MMHG
BH CV ECHO MEAS - RV MAX PG: 0.77 MMHG
BH CV ECHO MEAS - RV V1 MAX: 43.9 CM/SEC
BH CV ECHO MEAS - RVSP: 30 MMHG
BH CV ECHO MEAS - SI(CUBED): 36.2 ML/M^2
BH CV ECHO MEAS - SI(TEICH): 30.8 ML/M^2
BH CV ECHO MEAS - SV(CUBED): 73 ML
BH CV ECHO MEAS - SV(TEICH): 62 ML
BH CV ECHO MEAS - TAPSE (>1.6): 1.9 CM2
BH CV ECHO MEAS - TR MAX PG: 27 MMHG
BH CV ECHO MEAS - TR MAX VEL: 258 CM/SEC
BH CV ECHO MEASUREMENTS AVERAGE E/E' RATIO: 4.9
BH CV XLRA - RV BASE: 3.5 CM
BH CV XLRA - RV LENGTH: 8.4 CM
BH CV XLRA - RV MID: 3 CM
BH CV XLRA - TDI S': 11.7 CM/SEC
BILIRUB SERPL-MCNC: 0.4 MG/DL (ref 0.2–1.2)
BUN BLD-MCNC: 15 MG/DL (ref 6–20)
BUN/CREAT SERPL: 14 (ref 7–25)
CALCIUM SPEC-SCNC: 9.1 MG/DL (ref 8.6–10.5)
CHLORIDE SERPL-SCNC: 104 MMOL/L (ref 98–107)
CK MB SERPL-CCNC: 14.11 NG/ML
CK SERPL-CCNC: 139 U/L (ref 20–200)
CO2 SERPL-SCNC: 24 MMOL/L (ref 22–29)
CREAT BLD-MCNC: 1.07 MG/DL (ref 0.76–1.27)
CRP SERPL-MCNC: 12.03 MG/DL (ref 0–0.5)
D-LACTATE SERPL-SCNC: 1.3 MMOL/L (ref 0.5–2)
DEPRECATED RDW RBC AUTO: 40.8 FL (ref 37–54)
EOSINOPHIL # BLD AUTO: 0.31 10*3/MM3 (ref 0–0.4)
EOSINOPHIL NFR BLD AUTO: 3.8 % (ref 0.3–6.2)
ERYTHROCYTE [DISTWIDTH] IN BLOOD BY AUTOMATED COUNT: 11.9 % (ref 12.3–15.4)
ERYTHROCYTE [SEDIMENTATION RATE] IN BLOOD: 17 MM/HR (ref 0–15)
GFR SERPL CREATININE-BSD FRML MDRD: 88 ML/MIN/1.73
GLOBULIN UR ELPH-MCNC: 3.4 GM/DL
GLUCOSE BLD-MCNC: 120 MG/DL (ref 65–99)
HCT VFR BLD AUTO: 42.5 % (ref 37.5–51)
HGB BLD-MCNC: 13.8 G/DL (ref 13–17.7)
HOLD SPECIMEN: NORMAL
HOLD SPECIMEN: NORMAL
IMM GRANULOCYTES # BLD AUTO: 0.01 10*3/MM3 (ref 0–0.05)
IMM GRANULOCYTES NFR BLD AUTO: 0.1 % (ref 0–0.5)
LEFT ATRIUM VOLUME INDEX: 20.4 ML/M^2
LEFT ATRIUM VOLUME: 41 ML
LIPASE SERPL-CCNC: 14 U/L (ref 13–60)
LYMPHOCYTES # BLD AUTO: 3.03 10*3/MM3 (ref 0.7–3.1)
LYMPHOCYTES NFR BLD AUTO: 37.1 % (ref 19.6–45.3)
MCH RBC QN AUTO: 30.3 PG (ref 26.6–33)
MCHC RBC AUTO-ENTMCNC: 32.5 G/DL (ref 31.5–35.7)
MCV RBC AUTO: 93.4 FL (ref 79–97)
MONOCYTES # BLD AUTO: 0.94 10*3/MM3 (ref 0.1–0.9)
MONOCYTES NFR BLD AUTO: 11.5 % (ref 5–12)
NEUTROPHILS # BLD AUTO: 3.83 10*3/MM3 (ref 1.7–7)
NEUTROPHILS NFR BLD AUTO: 47 % (ref 42.7–76)
NRBC BLD AUTO-RTO: 0 /100 WBC (ref 0–0.2)
NT-PROBNP SERPL-MCNC: 204.5 PG/ML (ref 5–450)
PLATELET # BLD AUTO: 197 10*3/MM3 (ref 140–450)
PMV BLD AUTO: 10.9 FL (ref 6–12)
POTASSIUM BLD-SCNC: 4 MMOL/L (ref 3.5–5.2)
PROT SERPL-MCNC: 7 G/DL (ref 6–8.5)
RBC # BLD AUTO: 4.55 10*6/MM3 (ref 4.14–5.8)
S PYO AG THROAT QL: NEGATIVE
SODIUM BLD-SCNC: 141 MMOL/L (ref 136–145)
TROPONIN T SERPL-MCNC: 0.28 NG/ML (ref 0–0.03)
TROPONIN T SERPL-MCNC: 0.4 NG/ML (ref 0–0.03)
WBC NRBC COR # BLD: 8.16 10*3/MM3 (ref 3.4–10.8)
WHOLE BLOOD HOLD SPECIMEN: NORMAL
WHOLE BLOOD HOLD SPECIMEN: NORMAL

## 2019-12-15 PROCEDURE — 25010000002 KETOROLAC TROMETHAMINE PER 15 MG: Performed by: INTERNAL MEDICINE

## 2019-12-15 PROCEDURE — 93005 ELECTROCARDIOGRAM TRACING: CPT | Performed by: EMERGENCY MEDICINE

## 2019-12-15 PROCEDURE — 80053 COMPREHEN METABOLIC PANEL: CPT | Performed by: EMERGENCY MEDICINE

## 2019-12-15 PROCEDURE — 71045 X-RAY EXAM CHEST 1 VIEW: CPT

## 2019-12-15 PROCEDURE — G0378 HOSPITAL OBSERVATION PER HR: HCPCS

## 2019-12-15 PROCEDURE — 25010000002 CEFTRIAXONE PER 250 MG: Performed by: NURSE PRACTITIONER

## 2019-12-15 PROCEDURE — 93306 TTE W/DOPPLER COMPLETE: CPT | Performed by: INTERNAL MEDICINE

## 2019-12-15 PROCEDURE — 99222 1ST HOSP IP/OBS MODERATE 55: CPT | Performed by: FAMILY MEDICINE

## 2019-12-15 PROCEDURE — 82550 ASSAY OF CK (CPK): CPT | Performed by: NURSE PRACTITIONER

## 2019-12-15 PROCEDURE — 87081 CULTURE SCREEN ONLY: CPT | Performed by: NURSE PRACTITIONER

## 2019-12-15 PROCEDURE — 82553 CREATINE MB FRACTION: CPT | Performed by: NURSE PRACTITIONER

## 2019-12-15 PROCEDURE — 83605 ASSAY OF LACTIC ACID: CPT | Performed by: NURSE PRACTITIONER

## 2019-12-15 PROCEDURE — 83690 ASSAY OF LIPASE: CPT | Performed by: EMERGENCY MEDICINE

## 2019-12-15 PROCEDURE — 86664 EPSTEIN-BARR NUCLEAR ANTIGEN: CPT | Performed by: NURSE PRACTITIONER

## 2019-12-15 PROCEDURE — 84484 ASSAY OF TROPONIN QUANT: CPT | Performed by: EMERGENCY MEDICINE

## 2019-12-15 PROCEDURE — 86663 EPSTEIN-BARR ANTIBODY: CPT | Performed by: NURSE PRACTITIONER

## 2019-12-15 PROCEDURE — 25010000002 KETOROLAC TROMETHAMINE PER 15 MG: Performed by: NURSE PRACTITIONER

## 2019-12-15 PROCEDURE — 85652 RBC SED RATE AUTOMATED: CPT | Performed by: NURSE PRACTITIONER

## 2019-12-15 PROCEDURE — 83880 ASSAY OF NATRIURETIC PEPTIDE: CPT | Performed by: EMERGENCY MEDICINE

## 2019-12-15 PROCEDURE — 85025 COMPLETE CBC W/AUTO DIFF WBC: CPT | Performed by: EMERGENCY MEDICINE

## 2019-12-15 PROCEDURE — 86665 EPSTEIN-BARR CAPSID VCA: CPT | Performed by: NURSE PRACTITIONER

## 2019-12-15 PROCEDURE — 99285 EMERGENCY DEPT VISIT HI MDM: CPT

## 2019-12-15 PROCEDURE — 99254 IP/OBS CNSLTJ NEW/EST MOD 60: CPT | Performed by: INTERNAL MEDICINE

## 2019-12-15 PROCEDURE — 93306 TTE W/DOPPLER COMPLETE: CPT

## 2019-12-15 PROCEDURE — 87880 STREP A ASSAY W/OPTIC: CPT | Performed by: NURSE PRACTITIONER

## 2019-12-15 PROCEDURE — 25010000002 ONDANSETRON PER 1 MG: Performed by: EMERGENCY MEDICINE

## 2019-12-15 PROCEDURE — 87040 BLOOD CULTURE FOR BACTERIA: CPT | Performed by: NURSE PRACTITIONER

## 2019-12-15 PROCEDURE — 86140 C-REACTIVE PROTEIN: CPT | Performed by: NURSE PRACTITIONER

## 2019-12-15 RX ORDER — AMOXICILLIN 500 MG/1
500 CAPSULE ORAL 2 TIMES DAILY
Status: ON HOLD | COMMUNITY
End: 2019-12-15

## 2019-12-15 RX ORDER — ASPIRIN 81 MG/1
324 TABLET, CHEWABLE ORAL ONCE
Status: DISCONTINUED | OUTPATIENT
Start: 2019-12-15 | End: 2019-12-15

## 2019-12-15 RX ORDER — ONDANSETRON 2 MG/ML
4 INJECTION INTRAMUSCULAR; INTRAVENOUS ONCE
Status: COMPLETED | OUTPATIENT
Start: 2019-12-15 | End: 2019-12-15

## 2019-12-15 RX ORDER — IBUPROFEN 200 MG
800 TABLET ORAL EVERY 6 HOURS PRN
COMMUNITY
End: 2019-12-20 | Stop reason: HOSPADM

## 2019-12-15 RX ORDER — ASPIRIN 81 MG/1
324 TABLET, CHEWABLE ORAL ONCE
Status: COMPLETED | OUTPATIENT
Start: 2019-12-15 | End: 2019-12-15

## 2019-12-15 RX ORDER — SODIUM CHLORIDE 0.9 % (FLUSH) 0.9 %
10 SYRINGE (ML) INJECTION AS NEEDED
Status: DISCONTINUED | OUTPATIENT
Start: 2019-12-15 | End: 2019-12-20 | Stop reason: HOSPADM

## 2019-12-15 RX ORDER — MORPHINE SULFATE 4 MG/ML
4 INJECTION, SOLUTION INTRAMUSCULAR; INTRAVENOUS ONCE
Status: DISCONTINUED | OUTPATIENT
Start: 2019-12-15 | End: 2019-12-20 | Stop reason: HOSPADM

## 2019-12-15 RX ORDER — COLCHICINE 0.6 MG/1
0.6 TABLET ORAL EVERY 12 HOURS SCHEDULED
Status: DISCONTINUED | OUTPATIENT
Start: 2019-12-15 | End: 2019-12-17

## 2019-12-15 RX ORDER — AMOXICILLIN 500 MG/1
500 CAPSULE ORAL 2 TIMES DAILY
COMMUNITY
Start: 2019-12-12 | End: 2019-12-20 | Stop reason: HOSPADM

## 2019-12-15 RX ORDER — KETOROLAC TROMETHAMINE 30 MG/ML
30 INJECTION, SOLUTION INTRAMUSCULAR; INTRAVENOUS ONCE
Status: COMPLETED | OUTPATIENT
Start: 2019-12-15 | End: 2019-12-15

## 2019-12-15 RX ORDER — KETOROLAC TROMETHAMINE 30 MG/ML
30 INJECTION, SOLUTION INTRAMUSCULAR; INTRAVENOUS 2 TIMES DAILY
Status: DISCONTINUED | OUTPATIENT
Start: 2019-12-15 | End: 2019-12-19

## 2019-12-15 RX ORDER — SODIUM CHLORIDE 0.9 % (FLUSH) 0.9 %
10 SYRINGE (ML) INJECTION EVERY 12 HOURS SCHEDULED
Status: DISCONTINUED | OUTPATIENT
Start: 2019-12-15 | End: 2019-12-20 | Stop reason: HOSPADM

## 2019-12-15 RX ADMIN — COLCHICINE 0.6 MG: 0.6 TABLET, FILM COATED ORAL at 22:12

## 2019-12-15 RX ADMIN — ONDANSETRON 4 MG: 2 INJECTION INTRAMUSCULAR; INTRAVENOUS at 13:21

## 2019-12-15 RX ADMIN — SODIUM CHLORIDE 1000 ML: 9 INJECTION, SOLUTION INTRAVENOUS at 13:21

## 2019-12-15 RX ADMIN — CEFTRIAXONE 1 G: 1 INJECTION, POWDER, FOR SOLUTION INTRAMUSCULAR; INTRAVENOUS at 13:21

## 2019-12-15 RX ADMIN — SODIUM CHLORIDE 1000 ML: 9 INJECTION, SOLUTION INTRAVENOUS at 12:01

## 2019-12-15 RX ADMIN — SODIUM CHLORIDE, PRESERVATIVE FREE 10 ML: 5 INJECTION INTRAVENOUS at 22:12

## 2019-12-15 RX ADMIN — ASPIRIN 81 MG 324 MG: 81 TABLET ORAL at 13:25

## 2019-12-15 RX ADMIN — KETOROLAC TROMETHAMINE 30 MG: 30 INJECTION, SOLUTION INTRAMUSCULAR at 12:09

## 2019-12-15 RX ADMIN — KETOROLAC TROMETHAMINE 30 MG: 30 INJECTION, SOLUTION INTRAMUSCULAR; INTRAVENOUS at 22:12

## 2019-12-15 NOTE — H&P
"    [facility]  HISTORY AND PHYSICAL    Patient Name: Vamshi August  : 2000  MRN: [id number]  Primary Care Physician: Shayy Aguilar APRN    Subjective   Subjective     Chief Complaint:  Acute pericarditis, unspecified type [I30.9]    HPI:  Vamshi August is a 19 y.o. male with no known significant medical history who was admitted from the ED for idiopathic acute pericarditis. Approximately  4 days ago he began to have symptoms of sore throat with tonsillar exudate, general malaise, and fatigue. At this point he received a prescription for amoxicillin from a \"friend\" who has prescriptive authority who thought it looked to be strep throat. He has currently taken the amoxicillin for 3 days total. After taking the amoxicillin for 2 days his sore throat and malaise improved. When he awoke this morning he noticed that he didn't feel well, he shortly began to experience severe chest pain, shortness of breath, palpitations, and lightheadedness. He denies fainting, but he felt like he was going to. At this point he decided to come to the emergency department for evaluation. In ED he was noted to have ST segment abnormalities on EKG. Cardiologist Dr. Cervantes was consulted and performed an echocardiogram at the bedside who ruled out suspicion of an acute MI. No tamponade noted. He reports that his symptoms are much improved at this time following a dose of Toradol in the ED. He denies any recent travel or recent sick contacts. He will be admitted to telemetry for further observation.     He was also recently diagnosed and treated for chlamydia with Zithromax 1gram. He confirms that he took the medication as prescribed by urology.     ROS:   Review of Systems   Constitutional: Positive for diaphoresis. Negative for activity change and fever.   HENT: Negative for trouble swallowing and voice change.    Eyes: Negative for photophobia and visual " disturbance.   Respiratory: Positive for chest tightness and shortness of breath.    Cardiovascular: Positive for chest pain and palpitations.   Gastrointestinal: Negative for abdominal distention and abdominal pain.   Endocrine: Negative.    Genitourinary: Negative for difficulty urinating and frequency.   Musculoskeletal: Negative for gait problem and myalgias.   Skin: Negative for pallor and rash.   Allergic/Immunologic: Negative.    Neurological: Negative for dizziness, speech difficulty, weakness and numbness.   Hematological: Negative.    Psychiatric/Behavioral: Negative for agitation and confusion.     Otherwise complete ROS reviewed and is negative except as mentioned in the HPI.    Personal History     Past Medical History:   Diagnosis Date   • Known health problems: none        Past Surgical History:   Procedure Laterality Date   • DENTAL PROCEDURE     • WISDOM TOOTH EXTRACTION         Family History: family history includes No Known Problems in his brother, father, mother, and sister. Otherwise pertinent FHx was reviewed and unremarkable.     Social History:  reports that he has quit smoking. His smoking use included electronic cigarette. He has never used smokeless tobacco. He reports that he drinks alcohol. He reports that he does not use drugs.  Social History     Social History Narrative    Current college student, lives in house with 4 roommates.        Medications:    Available home medications reviewed.   Medications Prior to Admission   Medication Sig Dispense Refill Last Dose   • amoxicillin (AMOXIL) 500 MG capsule Take 500 mg by mouth 2 (Two) Times a Day.      • ibuprofen (ADVIL,MOTRIN) 200 MG tablet Take 800 mg by mouth Every 6 (Six) Hours As Needed for Mild Pain .          No Known Allergies    Objective   Objective     Vital Signs:   Temp:  [97.7 °F (36.5 °C)] 97.7 °F (36.5 °C)  Heart Rate:  [44-51] 44  Resp:  [18] 18  BP: ()/(47-72) 119/66        Physical Exam:   Constitutional: No  acute distress, awake, alert  Eyes: PERRL  HENT: NCAT, mucous membranes moist, petechia noted on posterior oropharynx   Neck: Supple, no thyromegaly, no lymphadenopathy  Respiratory: Clear to auscultation bilaterally, good effort, nonlabored respirations   Cardiovascular: RRR, no murmur  Gastrointestinal: Positive bowel sounds, soft, nontender, nondistended  Musculoskeletal: No peripheral edema, normal muscle tone for age  Psychiatric: Appropriate affect, good insight and judgement, cooperative  Neurologic: Oriented x 3, movements symmetric BUE and BLE, speech clear and fluent  Skin: warm, dry, intact    Results Reviewed:  I have personally reviewed current lab, radiology, and data and agree.    Results from last 7 days   Lab Units 12/15/19  1158   WBC 10*3/mm3 8.16   HEMOGLOBIN g/dL 13.8   HEMATOCRIT % 42.5   PLATELETS 10*3/mm3 197     Results from last 7 days   Lab Units 12/15/19  1158   SODIUM mmol/L 141   POTASSIUM mmol/L 4.0   CHLORIDE mmol/L 104   CO2 mmol/L 24.0   BUN mg/dL 15   CREATININE mg/dL 1.07   GLUCOSE mg/dL 120*   CALCIUM mg/dL 9.1   ALT (SGPT) U/L 25   AST (SGOT) U/L 48*     Estimated Creatinine Clearance: 125.4 mL/min (by C-G formula based on SCr of 1.07 mg/dL).  Brief Urine Lab Results  (Last result in the past 365 days)      Color   Clarity   Blood   Leuk Est   Nitrite   Protein   CREAT   Urine HCG        12/09/19 1247 Dark Yellow Clear Negative Negative Negative Negative             No results found for: BNP  Imaging Results (Last 24 Hours)     Procedure Component Value Units Date/Time    XR Chest 1 View [[id number]] Collected:  12/15/19 1246     Updated:  12/15/19 1247    Narrative:          EXAMINATION: XR CHEST 1 VW-      INDICATION: Chest Pain triage protocol      COMPARISON: NONE     FINDINGS: There is a normal cardiac silhouette. There is no pulmonary  inflammatory process, mass or effusion.           Impression:       No active disease              Results for orders placed during the  "hospital encounter of 12/15/19   Adult Transthoracic Echo Complete W/ Cont if Necessary Per Protocol    Narrative · Cardiac chamber sizes and wall thicknesses are normal.  · Global and segmental LV systolic function is normal. The estimated left   ventricular ejection fraction is 70%.  · The aortic and mitral valves are normal in terms of both structure and   function.  · There is trace to mild tricuspid regurgitation with a normal estimated   RV systolic pressure.  · There is a \"trivial\" pericardial effusion.          Assessment/Plan   Assessment / Plan     Active Hospital Problems    Diagnosis POA   • **Acute pericarditis [I30.9] Yes     Vamshi August is a 19 y.o. male with no known significant medical history who was admitted from the ED for idiopathic acute pericarditis.    Acute pericarditis, idiopathic  --rapid strep negative  --throat cultures pending  --blood cultures pending  --EBV titer ordered  --cardiology following  --Echo performed in ED  --Received rocephin 1gm IV in ED, will hold off on further ABX for now pending cultures  --IV Toradol BID for 24-48 hours per cards then transition to PO NSAIDS  --Colchicine 0.6mg BID      DVT prophylaxis: Ambulation    CODE STATUS:    Code Status and Medical Interventions:   Ordered at: 12/15/19 1716     Level Of Support Discussed With:    Patient     Code Status:    CPR     Medical Interventions (Level of Support Prior to Arrest):    Full     OBSERVATION status, however if further evaluation or treatment plans warrant, status may change.  Based upon current information, I predict patient's care encounter to be less than or equal to 2 midnights.    Electronically signed by SUSHMA Diego, 12/15/19, 5:16 PM.      Attending   Admission Attestation       I have seen and examined the patient, performing an independent face-to-face diagnostic evaluation with plan of care reviewed and developed with the advanced practice clinician (APC).      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  Earlier in the week, he noted he had a sore throat with exudative patches and had a friend prescribe amoxicillin for him.  His throat improved quickly and he was feeling well last night.  Upon arrival to the ER, he was noted to have ST elevation and bedside echo confirmed pericarditis.  There was no tamponade.  He has been started on IV toradol and reports his symptoms have improved but are still present.      Of note, he was recently treated by urology for a +chlamydia test with 1 GM azithromycin.      Remainder of detailed HPI is as noted by APC and has been reviewed and/or edited by me for completeness.    Attending Physical Exam:  Constitutional: Awake, alert, pleasant, comfortable   Eyes: PERRLA, sclerae anicteric, no conjunctival injection  HENT: NCAT, mucous membranes moist  Neck: Supple, no thyromegaly, no lymphadenopathy, trachea midline  Respiratory: Clear to auscultation bilaterally, nonlabored respirations   Cardiovascular: RRR, no murmurs, rubs, or gallops, palpable pedal pulses bilaterally  Gastrointestinal: Positive bowel sounds, soft, nontender, nondistended  Musculoskeletal: No bilateral ankle edema, no clubbing or cyanosis to extremities  Psychiatric: Appropriate affect, cooperative  Neurologic: Oriented x 3, strength symmetric in all extremities, Cranial Nerves grossly intact to confrontation, speech clear  Skin: No rashes      Brief Assessment/Plan :  See detailed assessment and plan developed with APC which I have reviewed and/or edited for completeness.    Electronically signed by Soila Shi MD, 12/15/19, 8:40 PM.

## 2019-12-15 NOTE — CONSULTS
"[facility]  CARDIOLOGY CONSULTATION NOTE    Vamshi August  : 2000  MRN:[id number]    Date of Admission:12/15/2019  Date of Consultation: 12/15/19    PCP: Shayy Aguilar APRN    IDENTIFICATION: A 19 y.o. male resident of [place], [place]     Chief Complaint   Patient presents with   • Chest pain    • Palpitations     PROBLEM LIST:   1. Pericarditis  A. Presentation to [facility] ED with acute onset chest pain 12/15/2019  B. EKG consistent with pericarditis, elevated ESR and C-RP              C. Single non-sustained narrow QRS rhythm of uncertain type  2. Sore throat              A. 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  He also works \"part-time\" in retail pharmacy.  This young man's growth and development has been unremarkable to this time.  He has had no major illnesses or surgeries.  He has no history of rheumatic fever, heart murmur, or other cardiac \"issues\".  He is active and goes to the gym 2-3 times a week (mainly for weight lifting).  There is no history of illicit drug use.  He uses occasional alcohol and does not smoke.  He is on no regular home medications.  He has been on amoxicillin that was written by a colleague 3 days ago to treat pharyngitis.  Cultures were not obtained.  Shortly after arising this morning he felt \"unwell\".  He developed chest pain, palpitations, and lightheadedness.  He did not have true presyncope or syncope.  On arrival in the ER he had somewhat generalized ST segment elevation and a slightly elevated troponin T.  Since being here he was given a single dose of Toradol and is now improved.  Also in the early ER phase he had a brief episode of rapid irregular narrow QRS tachycardia that is difficult for me to assess due to computer readout issues.  This is not " "recurred.      ROS: All systems have been reviewed and are negative with the exception of those mentioned in the HPI and problem list above.    Surgical History:   Past Surgical History:   Procedure Laterality Date   • DENTAL PROCEDURE     • WISDOM TOOTH EXTRACTION         Social History:   Social History     Socioeconomic History   • Marital status: Single   Tobacco Use   • Smoking status: Former Smoker     Types: Electronic Cigarette   • Smokeless tobacco: Never Used   Substance and Sexual Activity   • Alcohol use: Yes     Frequency: 2-4 times a month     Drinks per session: 3 or 4     Binge frequency: Never     Comment: 1-2 times a month   • Drug use: No   • Sexual activity: Not Currently     Partners: Female       Family History:   Family History   Problem Relation Age of Onset   • No Known Problems Mother    • No Known Problems Father    • No Known Problems Sister    • No Known Problems Brother        Objective     BP 94/47   Pulse 51   Temp 97.7 °F (36.5 °C) (Oral)   Resp 18   Ht 182.9 cm (72\")   Wt 79.4 kg (175 lb)   SpO2 99%   BMI 23.73 kg/m²     Intake/Output Summary (Last 24 hours) at 12/15/2019 1611  Last data filed at 12/15/2019 1239  Gross per 24 hour   Intake 1000 ml   Output --   Net 1000 ml       PHYSICAL EXAM:  CONSTITUTIONAL: Well nourished, cooperative, in no acute distress  HEENT: Normocephalic, atraumatic, PERRLA, no JVD, no carotid bruit  CARDIOVASCULAR:  Regular rhythm and normal rate, no murmur, gallop, rub. Peripheral pulses are present and equal bilaterally  RESPIRATORY: Clear to auscultation, normal respiratory effort, no wheezing, rales or rhonchi  GI: Soft, nontender, normal bowel sounds  MUSCULOSKELETAL: No gross deformities, no edema  SKIN: Warm, dry. No bleeding, bruising or rash  NEUROLOGICAL: No focal deficits  PSYCHIATRIC: Normal mood and affect. Behavior is normal     Labs/Diagnostic Data  Results from last 7 days   Lab Units 12/15/19  1158   SODIUM mmol/L 141   POTASSIUM " mmol/L 4.0   CHLORIDE mmol/L 104   CO2 mmol/L 24.0   BUN mg/dL 15   CREATININE mg/dL 1.07   GLUCOSE mg/dL 120*   CALCIUM mg/dL 9.1     Results from last 7 days   Lab Units 12/15/19  1354 12/15/19  1158   CK TOTAL U/L  --  139   TROPONIN T ng/mL 0.398* 0.278*     Results from last 7 days   Lab Units 12/15/19  1158   WBC 10*3/mm3 8.16   HEMOGLOBIN g/dL 13.8   HEMATOCRIT % 42.5   PLATELETS 10*3/mm3 197       Results from last 7 days   Lab Units 12/15/19  1158   PROBNP pg/mL 204.5       I personally reviewed the patient's EKG/Telemetry data    Radiology Data: CXR and ECHO reported separately.    Current Medications:      Morphine 4 mg Intravenous Once          Assessment and Plan:     At this time, the most likely diagnosis is pericarditis versus epicarditis.  He had a mildly depressed blood pressure on admission and this has improved with fluids; however, this is not been a cardiac issue because of normal LV function in the absence of any significant pericardial fluid on echo.  The patient has been admitted.  I would recommend Toradol for the next 24 to 48 hours and then a conversion to oral nonsteroidal agents.  I will institute colchicine at a dose of 0.6 mg p.o. twice daily.  We will follow troponins and EKGs.  Final disposition will depend on the patient's initial clinical course.        Scribed for Fredi Cervantes MD by Esther Cade PA-C. 12/15/2019  4:11 PM      Thank you for allowing me to participate in the care of Vamshi August. Feel free to contact me directly with any further questions or concerns.

## 2019-12-15 NOTE — ED PROVIDER NOTES
Subjective   Patient presents to the emergency department with complaint of sudden onset of chest pain early this morning accompanied by shortness of breath and feeling as though his heart is racing with diaphoresis.  Further history reveals that Mr. August has had a recent sore throat.  4 days ago, he showed his friend who has prescriptive authority how his throat looked and strep throat was suspected.  Patient was given a prescription for amoxicillin which she has been taking twice daily for 3 days now.  Patient states he has been feeling significantly better since taking amoxicillin.  Nonetheless, he had presyncopal experience this morning and presenting with hypotension today.    Further history reveals that 1 week ago, he was seen by urologist and diagnosed with chlamydia and placed on Zithromax 1 g.  Patient has had no urinary symptoms since then.    Patient denies any substance use or abuse.  No excessive caffeine use.  He has vape at on occasion the last occurrence was about 1 week ago.          History provided by:  Patient   used: No    Chest Pain   Pain location:  L chest  Pain quality: aching    Pain radiates to:  L arm  Pain severity:  Moderate (Chest pain originally rated 7 or 8 out of 10)  Onset quality:  Sudden  Duration:  2 hours  Timing:  Constant  Progression:  Unchanged  Chronicity:  New  Context: at rest    Context: not breathing    Relieved by:  Nothing  Worsened by:  Nothing  Ineffective treatments:  None tried  Associated symptoms: fatigue, nausea and shortness of breath    Associated symptoms: no cough, no fever and no lower extremity edema    Risk factors: male sex    Risk factors: no coronary artery disease, no diabetes mellitus, no high cholesterol, no hypertension, no prior DVT/PE and no smoking        Review of Systems   Constitutional: Positive for fatigue. Negative for fever.   HENT: Positive for sore throat (recent sore throat with exudate; emperically  treated with amoxil BID by a friend without strep swab).    Respiratory: Positive for shortness of breath. Negative for cough.    Cardiovascular: Positive for chest pain.   Gastrointestinal: Positive for nausea.   Genitourinary:        Recent STD testing positive for chlamydia.  Negative for gonorrhea.  Patient took 1 g of Zithromax prescribed by Dr. Dangelo, urologist.   Skin: Positive for pallor.   Allergic/Immunologic: Negative for immunocompromised state.   Neurological: Negative.    Hematological: Negative.  Does not bruise/bleed easily.   Psychiatric/Behavioral: Negative.    All other systems reviewed and are negative.      Past Medical History:   Diagnosis Date   • Known health problems: none        No Known Allergies    Past Surgical History:   Procedure Laterality Date   • DENTAL PROCEDURE     • WISDOM TOOTH EXTRACTION         Family History   Problem Relation Age of Onset   • No Known Problems Mother    • No Known Problems Father    • No Known Problems Sister    • No Known Problems Brother        Social History     Socioeconomic History   • Marital status: Single     Spouse name: Not on file   • Number of children: Not on file   • Years of education: Not on file   • Highest education level: Not on file   Tobacco Use   • Smoking status: Former Smoker     Types: Electronic Cigarette   • Smokeless tobacco: Never Used   Substance and Sexual Activity   • Alcohol use: Yes     Frequency: 2-4 times a month     Drinks per session: 3 or 4     Binge frequency: Never     Comment: 1-2 times a month   • Drug use: No   • Sexual activity: Not Currently     Partners: Female           Objective   Physical Exam   Constitutional: He is oriented to person, place, and time. He appears well-developed and well-nourished. He appears distressed.   Patient has hypotension and pallor     HENT:   Head: Normocephalic and atraumatic.   Mouth/Throat: Oropharynx is clear and moist.   Posterior pharynx has no hypertrophy to tonsillar pillars  and no current exudate.  There is some petechiae on the uvula however.   Eyes: Pupils are equal, round, and reactive to light. EOM are normal. No scleral icterus.   Neck: Normal range of motion. Neck supple. No JVD present.   Cardiovascular: Regular rhythm and intact distal pulses. Exam reveals no friction rub.   No murmur heard.  Bradycardia.  No murmur.  No rub     Pulmonary/Chest: Effort normal. No respiratory distress. He has no wheezes. He has no rales (no rales in the bases.  ).   Abdominal: Soft. Bowel sounds are normal. He exhibits no distension. There is no tenderness. There is no rebound and no guarding.   Musculoskeletal: Normal range of motion. He exhibits no edema.   Neurological: He is alert and oriented to person, place, and time.   Skin: Skin is warm and dry. Capillary refill takes less than 2 seconds. No rash noted. There is pallor.   Psychiatric: He has a normal mood and affect. His behavior is normal. Judgment and thought content normal.   Nursing note and vitals reviewed.      Critical Care  Performed by: Giuliana Garcia APRN  Authorized by: Riccardo Ramos MD     Critical care provider statement:     Critical care time (minutes):  60    Critical care was necessary to treat or prevent imminent or life-threatening deterioration of the following conditions:  Cardiac failure    Critical care was time spent personally by me on the following activities:  Examination of patient, interpretation of cardiac output measurements, obtaining history from patient or surrogate, evaluation of patient's response to treatment, discussions with consultants, ordering and performing treatments and interventions, ordering and review of laboratory studies, ordering and review of radiographic studies, pulse oximetry, re-evaluation of patient's condition and review of old charts    I assumed direction of critical care for this patient from another provider in my specialty: no                 ED Course  ED  Course as of Dec 15 1559   Sun Dec 15, 2019   1240 Troponin T(!!): 0.278 [MS]   1242 Sed Rate(!): 17 [MS]   1248 I have conferred with Dr. Ramos regarding elevated troponin.  Patient's pain is down to 5 from 7 or 8 in response to fluids and Toradol.  Suspicious for infectious myocarditis.  Continue to hydrate, add aspirin and morphine.  Plan a second troponin and hospitalization.  The patient understands and concurs with plan of care.    [MS]   1250 C-Reactive Protein(!): 12.03 [MS]   1336 CKMB(!): 14.11 [MS]   1426 Patient remains hypotensive in spite of IV fluid bolus.  Lungs do not sound wet.  He has no JVD consistent with tamponade.  I cannot hear a rub.  I have spoken to Dr. Cervantes, cardiologist; Echo is pending. Patient is resting quietly with sinus ellen.       [MS]   1441 Troponin(!!) [MS]   1442 Troponin T(!!): 0.398 [MS]   1503 Dr. Cervantes is at the bedside, echo in progress.    [MS]   1511 I have personally seen the patient and discussed with Dr. Cervantes.  Patient with evidence of likely myocarditis.  Abnormal EKG with elevated troponin.  All further as per cardiology.  Cardiology is at bedside.    [RS]   1556 Conferring once again with Dr. Cervantes, he confirms admission to hospitalist care with his consultation.  His review of the echocardiogram reveals that the patient has normal left ventricular function and no tamponade; thus, will give him some more IVF. Dr. Shi accepts the patient for admission.  The patient understands and concurs with plan of care.    [MS]      ED Course User Index  [MS] Giuliana Garcia APRN  [RS] Riccardo Ramos MD           Recent Results (from the past 24 hour(s))   Troponin    Collection Time: 12/15/19 11:58 AM   Result Value Ref Range    Troponin T 0.278 (C) 0.000 - 0.030 ng/mL   Comprehensive Metabolic Panel    Collection Time: 12/15/19 11:58 AM   Result Value Ref Range    Glucose 120 (H) 65 - 99 mg/dL    BUN 15 6 - 20 mg/dL    Creatinine 1.07 0.76 - 1.27 mg/dL     Sodium 141 136 - 145 mmol/L    Potassium 4.0 3.5 - 5.2 mmol/L    Chloride 104 98 - 107 mmol/L    CO2 24.0 22.0 - 29.0 mmol/L    Calcium 9.1 8.6 - 10.5 mg/dL    Total Protein 7.0 6.0 - 8.5 g/dL    Albumin 3.60 3.50 - 5.20 g/dL    ALT (SGPT) 25 1 - 41 U/L    AST (SGOT) 48 (H) 1 - 40 U/L    Alkaline Phosphatase 130 (H) 39 - 117 U/L    Total Bilirubin 0.4 0.2 - 1.2 mg/dL    eGFR Non African Amer 88 >60 mL/min/1.73    Globulin 3.4 gm/dL    A/G Ratio 1.1 g/dL    BUN/Creatinine Ratio 14.0 7.0 - 25.0    Anion Gap 13.0 5.0 - 15.0 mmol/L   Lipase    Collection Time: 12/15/19 11:58 AM   Result Value Ref Range    Lipase 14 13 - 60 U/L   BNP    Collection Time: 12/15/19 11:58 AM   Result Value Ref Range    proBNP 204.5 5.0 - 450.0 pg/mL   Light Blue Top    Collection Time: 12/15/19 11:58 AM   Result Value Ref Range    Extra Tube hold for add-on    Green Top (Gel)    Collection Time: 12/15/19 11:58 AM   Result Value Ref Range    Extra Tube Hold for add-ons.    Lavender Top    Collection Time: 12/15/19 11:58 AM   Result Value Ref Range    Extra Tube hold for add-on    Gold Top - SST    Collection Time: 12/15/19 11:58 AM   Result Value Ref Range    Extra Tube Hold for add-ons.    CBC Auto Differential    Collection Time: 12/15/19 11:58 AM   Result Value Ref Range    WBC 8.16 3.40 - 10.80 10*3/mm3    RBC 4.55 4.14 - 5.80 10*6/mm3    Hemoglobin 13.8 13.0 - 17.7 g/dL    Hematocrit 42.5 37.5 - 51.0 %    MCV 93.4 79.0 - 97.0 fL    MCH 30.3 26.6 - 33.0 pg    MCHC 32.5 31.5 - 35.7 g/dL    RDW 11.9 (L) 12.3 - 15.4 %    RDW-SD 40.8 37.0 - 54.0 fl    MPV 10.9 6.0 - 12.0 fL    Platelets 197 140 - 450 10*3/mm3    Neutrophil % 47.0 42.7 - 76.0 %    Lymphocyte % 37.1 19.6 - 45.3 %    Monocyte % 11.5 5.0 - 12.0 %    Eosinophil % 3.8 0.3 - 6.2 %    Basophil % 0.5 0.0 - 1.5 %    Immature Grans % 0.1 0.0 - 0.5 %    Neutrophils, Absolute 3.83 1.70 - 7.00 10*3/mm3    Lymphocytes, Absolute 3.03 0.70 - 3.10 10*3/mm3    Monocytes, Absolute 0.94 (H) 0.10  - 0.90 10*3/mm3    Eosinophils, Absolute 0.31 0.00 - 0.40 10*3/mm3    Basophils, Absolute 0.04 0.00 - 0.20 10*3/mm3    Immature Grans, Absolute 0.01 0.00 - 0.05 10*3/mm3    nRBC 0.0 0.0 - 0.2 /100 WBC   C-reactive Protein    Collection Time: 12/15/19 11:58 AM   Result Value Ref Range    C-Reactive Protein 12.03 (H) 0.00 - 0.50 mg/dL   Sedimentation Rate    Collection Time: 12/15/19 11:58 AM   Result Value Ref Range    Sed Rate 17 (H) 0 - 15 mm/hr   CK    Collection Time: 12/15/19 11:58 AM   Result Value Ref Range    Creatine Kinase 139 20 - 200 U/L   CK-MB    Collection Time: 12/15/19 11:58 AM   Result Value Ref Range    CKMB 14.11 (H) <=10.40 ng/mL   Rapid Strep A Screen - Swab, Throat    Collection Time: 12/15/19 12:10 PM   Result Value Ref Range    Strep A Ag Negative Negative   Lactic Acid, Plasma    Collection Time: 12/15/19  1:00 PM   Result Value Ref Range    Lactate 1.3 0.5 - 2.0 mmol/L   Troponin    Collection Time: 12/15/19  1:54 PM   Result Value Ref Range    Troponin T 0.398 (C) 0.000 - 0.030 ng/mL   Adult Transthoracic Echo Complete W/ Cont if Necessary Per Protocol    Collection Time: 12/15/19  3:20 PM   Result Value Ref Range    BSA 2.0 m^2    IVSd 0.91 cm    LVIDd 4.8 cm    LVIDs 3.3 cm    LVPWd 0.95 cm    IVS/LVPW 0.96     FS 30.8 %    EDV(Teich) 106.5 ml    ESV(Teich) 44.5 ml    EF(Teich) 58.2 %    EDV(cubed) 109.2 ml    ESV(cubed) 36.3 ml    EF(cubed) 66.8 %    LV mass(C)d 153.3 grams    LV mass(C)dI 76.2 grams/m^2    SV(Teich) 62.0 ml    SI(Teich) 30.8 ml/m^2    SV(cubed) 73.0 ml    SI(cubed) 36.2 ml/m^2    Ao root diam 2.6 cm    Ao root area 5.3 cm^2    LA dimension 3.5 cm    LA/Ao 1.3     LAd major 5.4 cm    LA volume 41.0 ml    Ao root area (BSA corrected) 1.3     LA Volume Index 20.4 ml/m^2    MV E max dipti 80.9 cm/sec    MV A max dipti 25.2 cm/sec    MV E/A 3.2     MV P1/2t max dipti 96.5 cm/sec    MV P1/2t 111.1 msec    MVA(P1/2t) 2.0 cm^2    MV dec slope 254.5 cm/sec^2    MV dec time 0.19 sec  "   PA acc slope 417.0 cm/sec^2    PA acc time 0.15 sec    PI end-d irvin 90.3 cm/sec    RV V1 max PG 0.77 mmHg    RV V1 max 43.9 cm/sec    TR max irvin 258.0 cm/sec     CV ECHO MARIE - TR MAX PG 27.0 mmHg    RVSP(TR) 30.0 mmHg    RAP systole 3.0 mmHg    PA pr(Accel) 11.1 mmHg    MVA P1/2T LCG 2.3 cm^2    Lat E/e'  5.1     Med E/e' 4.8     Lat Peak E' Irvin 16.0 cm/sec    Med Peak E' Irvin 17.0 cm/sec     CV ECHO MARIE - BZI_BMI 23.7 kilograms/m^2     CV ECHO MARIE - BSA(HAYCOCK) 2.0 m^2     CV ECHO MARIE - BZI_METRIC_WEIGHT 79.4 kg     CV ECHO MARIE - BZI_METRIC_HEIGHT 182.9 cm    Avg E/e' ratio 4.90     TDI S' 11.70 cm/sec    RV Base 3.50 cm    RV Length 8.40 cm    RV Mid 3.00 cm    TAPSE (>1.6) 1.90 cm2     Note: In addition to lab results from this visit, the labs listed above may include labs taken at another facility or during a different encounter within the last 24 hours. Please correlate lab times with ED admission and discharge times for further clarification of the services performed during this visit.    XR Chest 1 View   Preliminary Result   No active disease                Vitals:    12/15/19 1137 12/15/19 1200 12/15/19 1215 12/15/19 1259   BP: 100/57 107/66 99/64 94/47   BP Location: Left arm      Patient Position: Sitting      Pulse: 50 (!) 47 (!) 46 51   Resp: 18      Temp: 97.7 °F (36.5 °C)      TempSrc: Oral      SpO2: 100% 99% 100% 99%   Weight: 79.4 kg (175 lb)      Height: 182.9 cm (72\")        Medications   sodium chloride 0.9 % flush 10 mL (has no administration in time range)   Morphine sulfate (PF) injection 4 mg (0 mg Intravenous Hold 12/15/19 1322)   sodium chloride 0.9 % bolus 1,000 mL (0 mL Intravenous Stopped 12/15/19 1239)   ketorolac (TORADOL) injection 30 mg (30 mg Intravenous Given 12/15/19 120)   aspirin chewable tablet 324 mg (324 mg Oral Given 12/15/19 1325)   sodium chloride 0.9 % bolus 1,000 mL (0 mL Intravenous Stopped 12/15/19 3257)   ondansetron (ZOFRAN) injection 4 mg (4 mg " Intravenous Given 12/15/19 1321)   cefTRIAXone (ROCEPHIN) 1 g/100 mL 0.9% NS (MBP) (0 g Intravenous Stopped 12/15/19 1404)     ECG/EMG Results (last 24 hours)     Procedure Component Value Units Date/Time    ECG 12 Lead [[id number]] Collected:  12/15/19 1349     Updated:  12/15/19 1441    Narrative:       Test Reason : 2ND SET  Blood Pressure : **/** mmHG  Vent. Rate : 049 BPM     Atrial Rate : 049 BPM     P-R Int : 110 ms          QRS Dur : 094 ms      QT Int : 420 ms       P-R-T Axes : 012 070 067 degrees     QTc Int : 379 ms    Marked sinus bradycardia with short CA  ST elevation, consider inferolateral injury or acute infarct    Abnormal ECG  When compared with ECG of 15-DEC-2019 11:51, (Unconfirmed)  Sinus rhythm  Confirmed by SHIRLEY RILEY MD ([id number]) on 12/15/2019 2:41:07 PM    Referred By:  [facility]           Confirmed By:SHIRLEY RILEY MD    ECG 12 Lead [[id number]] Collected:  12/15/19 1151     Updated:  12/15/19 1441    Narrative:       Test Reason : PALPITATIONS  Blood Pressure : **/** mmHG  Vent. Rate : 049 BPM     Atrial Rate : 043 BPM     P-R Int : 000 ms          QRS Dur : 096 ms      QT Int : 420 ms       P-R-T Axes : 000 070 069 degrees     QTc Int : 379 ms    Sinus Bradycardia  ST elevation, consider inferior injury or acute infarct  No reciporcal changes.  Abnormal ECG  No previous ECGs available  Confirmed by SHIRLEY RILEY MD ([id number]) on 12/15/2019 2:41:33 PM    Referred By: MD ER           Confirmed By:SHIRLEY RILEY MD    Adult Transthoracic Echo Complete W/ Cont if Necessary Per Protocol [[id number]] Collected:  12/15/19 1457     Updated:  12/15/19 1522     BSA 2.0 m^2      IVSd 0.91 cm      LVIDd 4.8 cm      LVIDs 3.3 cm      LVPWd 0.95 cm      IVS/LVPW 0.96     FS 30.8 %      EDV(Teich) 106.5 ml      ESV(Teich) 44.5 ml      EF(Teich) 58.2 %      EDV(cubed) 109.2 ml      ESV(cubed) 36.3 ml      EF(cubed) 66.8 %      LV mass(C)d 153.3 grams      LV mass(C)dI 76.2 grams/m^2      SV(Teich) 62.0 ml       SI(Teich) 30.8 ml/m^2      SV(cubed) 73.0 ml      SI(cubed) 36.2 ml/m^2      Ao root diam 2.6 cm      Ao root area 5.3 cm^2      LA dimension 3.5 cm      LA/Ao 1.3     LAd major 5.4 cm      LA volume 41.0 ml      Ao root area (BSA corrected) 1.3     LA Volume Index 20.4 ml/m^2      MV E max irvin 80.9 cm/sec      MV A max irvin 25.2 cm/sec      MV E/A 3.2     MV P1/2t max irvin 96.5 cm/sec      MV P1/2t 111.1 msec      MVA(P1/2t) 2.0 cm^2      MV dec slope 254.5 cm/sec^2      MV dec time 0.19 sec      PA acc slope 417.0 cm/sec^2      PA acc time 0.15 sec      PI end-d irvin 90.3 cm/sec      RV V1 max PG 0.77 mmHg      RV V1 max 43.9 cm/sec      TR max irvin 258.0 cm/sec       CV ECHO MARIE - TR MAX PG 27.0 mmHg      RVSP(TR) 30.0 mmHg      RAP systole 3.0 mmHg      PA pr(Accel) 11.1 mmHg      MVA P1/2T LCG 2.3 cm^2      Lat E/e'  5.1     Med E/e' 4.8     Lat Peak E' Irvin 16.0 cm/sec      Med Peak E' Irvin 17.0 cm/sec       CV ECHO MARIE - BZI_BMI 23.7 kilograms/m^2       CV ECHO MARIE - BSA(HAYCOCK) 2.0 m^2       CV ECHO MARIE - BZI_METRIC_WEIGHT 79.4 kg       CV ECHO MARIE - BZI_METRIC_HEIGHT 182.9 cm      Avg E/e' ratio 4.90     TDI S' 11.70 cm/sec      RV Base 3.50 cm      RV Length 8.40 cm      RV Mid 3.00 cm      TAPSE (>1.6) 1.90 cm2         ECG 12 Lead   Final Result   Test Reason : 2ND SET   Blood Pressure : **/** mmHG   Vent. Rate : 049 BPM     Atrial Rate : 049 BPM      P-R Int : 110 ms          QRS Dur : 094 ms       QT Int : 420 ms       P-R-T Axes : 012 070 067 degrees      QTc Int : 379 ms      Marked sinus bradycardia with short RI   ST elevation, consider inferolateral injury or acute infarct      Abnormal ECG   When compared with ECG of 15-DEC-2019 11:51, (Unconfirmed)   Sinus rhythm   Confirmed by SHIRLEY RILEY MD ([id number]) on 12/15/2019 2:41:07 PM      Referred By:  [facility]           Confirmed By:SHIRLEY RILEY MD      ECG 12 Lead   Final Result   Test Reason : PALPITATIONS   Blood Pressure : **/** mmHG   Vent.  Rate : 049 BPM     Atrial Rate : 043 BPM      P-R Int : 000 ms          QRS Dur : 096 ms       QT Int : 420 ms       P-R-T Axes : 000 070 069 degrees      QTc Int : 379 ms      Sinus Bradycardia   ST elevation, consider inferior injury or acute infarct   No reciporcal changes.   Abnormal ECG   No previous ECGs available   Confirmed by SHIRLEY RILEY MD ([id number]) on 12/15/2019 2:41:33 PM      Referred By: MD ER           Confirmed By:SHIRLEY RILEY MD                     No data recorded                        MDM    Final diagnoses:   Acute pericarditis, unspecified type   History of sore throat   History of chlamydia infection              Giuliana Garcia, APRN  12/15/19 6913

## 2019-12-15 NOTE — PLAN OF CARE
Problem: Patient Care Overview  Goal: Plan of Care Review  Outcome: Ongoing (interventions implemented as appropriate)  Flowsheets (Taken 12/15/2019 1702)  Progress: no change  Plan of Care Reviewed With: patient  Outcome Summary: Patient admitted to the floor from the ED, VSS no other issues

## 2019-12-16 ENCOUNTER — APPOINTMENT (OUTPATIENT)
Dept: MRI IMAGING | Facility: HOSPITAL | Age: 19
End: 2019-12-16

## 2019-12-16 PROBLEM — R77.8 ELEVATED TROPONIN: Status: ACTIVE | Noted: 2019-12-16

## 2019-12-16 PROBLEM — I51.4 MYOCARDITIS (HCC): Status: ACTIVE | Noted: 2019-12-16

## 2019-12-16 LAB
ANION GAP SERPL CALCULATED.3IONS-SCNC: 9 MMOL/L (ref 5–15)
B PARAPERT DNA SPEC QL NAA+PROBE: NOT DETECTED
B PERT DNA SPEC QL NAA+PROBE: NOT DETECTED
BUN BLD-MCNC: 14 MG/DL (ref 6–20)
BUN/CREAT SERPL: 16.9 (ref 7–25)
C PNEUM DNA NPH QL NAA+NON-PROBE: NOT DETECTED
CALCIUM SPEC-SCNC: 8.6 MG/DL (ref 8.6–10.5)
CHLORIDE SERPL-SCNC: 107 MMOL/L (ref 98–107)
CO2 SERPL-SCNC: 23 MMOL/L (ref 22–29)
CREAT BLD-MCNC: 0.83 MG/DL (ref 0.76–1.27)
DEPRECATED RDW RBC AUTO: 41.8 FL (ref 37–54)
ERYTHROCYTE [DISTWIDTH] IN BLOOD BY AUTOMATED COUNT: 12.1 % (ref 12.3–15.4)
FLUAV H1 2009 PAND RNA NPH QL NAA+PROBE: NOT DETECTED
FLUAV H1 HA GENE NPH QL NAA+PROBE: NOT DETECTED
FLUAV H3 RNA NPH QL NAA+PROBE: NOT DETECTED
FLUAV SUBTYP SPEC NAA+PROBE: NOT DETECTED
FLUBV RNA ISLT QL NAA+PROBE: NOT DETECTED
GFR SERPL CREATININE-BSD FRML MDRD: 118 ML/MIN/1.73
GLUCOSE BLD-MCNC: 106 MG/DL (ref 65–99)
HADV DNA SPEC NAA+PROBE: NOT DETECTED
HCOV 229E RNA SPEC QL NAA+PROBE: NOT DETECTED
HCOV HKU1 RNA SPEC QL NAA+PROBE: NOT DETECTED
HCOV NL63 RNA SPEC QL NAA+PROBE: NOT DETECTED
HCOV OC43 RNA SPEC QL NAA+PROBE: NOT DETECTED
HCT VFR BLD AUTO: 36.9 % (ref 37.5–51)
HGB BLD-MCNC: 12.1 G/DL (ref 13–17.7)
HMPV RNA NPH QL NAA+NON-PROBE: NOT DETECTED
HPIV1 RNA SPEC QL NAA+PROBE: NOT DETECTED
HPIV2 RNA SPEC QL NAA+PROBE: NOT DETECTED
HPIV3 RNA NPH QL NAA+PROBE: NOT DETECTED
HPIV4 P GENE NPH QL NAA+PROBE: NOT DETECTED
M PNEUMO IGG SER IA-ACNC: NOT DETECTED
MCH RBC QN AUTO: 30.9 PG (ref 26.6–33)
MCHC RBC AUTO-ENTMCNC: 32.8 G/DL (ref 31.5–35.7)
MCV RBC AUTO: 94.1 FL (ref 79–97)
PLATELET # BLD AUTO: 236 10*3/MM3 (ref 140–450)
PMV BLD AUTO: 11.3 FL (ref 6–12)
POTASSIUM BLD-SCNC: 4 MMOL/L (ref 3.5–5.2)
RBC # BLD AUTO: 3.92 10*6/MM3 (ref 4.14–5.8)
RHINOVIRUS RNA SPEC NAA+PROBE: NOT DETECTED
RSV RNA NPH QL NAA+NON-PROBE: NOT DETECTED
SODIUM BLD-SCNC: 139 MMOL/L (ref 136–145)
TROPONIN T SERPL-MCNC: 0.71 NG/ML (ref 0–0.03)
TROPONIN T SERPL-MCNC: 1.1 NG/ML (ref 0–0.03)
WBC NRBC COR # BLD: 7.57 10*3/MM3 (ref 3.4–10.8)

## 2019-12-16 PROCEDURE — 82164 ANGIOTENSIN I ENZYME TEST: CPT | Performed by: INTERNAL MEDICINE

## 2019-12-16 PROCEDURE — 86658 ENTEROVIRUS ANTIBODY: CPT | Performed by: INTERNAL MEDICINE

## 2019-12-16 PROCEDURE — 93005 ELECTROCARDIOGRAM TRACING: CPT | Performed by: INTERNAL MEDICINE

## 2019-12-16 PROCEDURE — 84484 ASSAY OF TROPONIN QUANT: CPT | Performed by: INTERNAL MEDICINE

## 2019-12-16 PROCEDURE — 86256 FLUORESCENT ANTIBODY TITER: CPT | Performed by: INTERNAL MEDICINE

## 2019-12-16 PROCEDURE — 90686 IIV4 VACC NO PRSV 0.5 ML IM: CPT | Performed by: FAMILY MEDICINE

## 2019-12-16 PROCEDURE — 25010000002 MORPHINE PER 10 MG: Performed by: INTERNAL MEDICINE

## 2019-12-16 PROCEDURE — 99233 SBSQ HOSP IP/OBS HIGH 50: CPT | Performed by: INTERNAL MEDICINE

## 2019-12-16 PROCEDURE — 93005 ELECTROCARDIOGRAM TRACING: CPT | Performed by: FAMILY MEDICINE

## 2019-12-16 PROCEDURE — G0008 ADMIN INFLUENZA VIRUS VAC: HCPCS | Performed by: FAMILY MEDICINE

## 2019-12-16 PROCEDURE — 0100U HC BIOFIRE FILMARRAY RESP PANEL 2: CPT | Performed by: INTERNAL MEDICINE

## 2019-12-16 PROCEDURE — 93010 ELECTROCARDIOGRAM REPORT: CPT | Performed by: INTERNAL MEDICINE

## 2019-12-16 PROCEDURE — 83520 IMMUNOASSAY QUANT NOS NONAB: CPT | Performed by: INTERNAL MEDICINE

## 2019-12-16 PROCEDURE — 99232 SBSQ HOSP IP/OBS MODERATE 35: CPT | Performed by: INTERNAL MEDICINE

## 2019-12-16 PROCEDURE — 80048 BASIC METABOLIC PNL TOTAL CA: CPT | Performed by: INTERNAL MEDICINE

## 2019-12-16 PROCEDURE — 85027 COMPLETE CBC AUTOMATED: CPT | Performed by: NURSE PRACTITIONER

## 2019-12-16 PROCEDURE — 86778 TOXOPLASMA ANTIBODY IGM: CPT | Performed by: INTERNAL MEDICINE

## 2019-12-16 PROCEDURE — A9577 INJ MULTIHANCE: HCPCS | Performed by: INTERNAL MEDICINE

## 2019-12-16 PROCEDURE — 25010000002 INFLUENZA VAC SPLIT QUAD 0.5 ML SUSPENSION PREFILLED SYRINGE: Performed by: FAMILY MEDICINE

## 2019-12-16 PROCEDURE — 86038 ANTINUCLEAR ANTIBODIES: CPT | Performed by: INTERNAL MEDICINE

## 2019-12-16 PROCEDURE — 86431 RHEUMATOID FACTOR QUANT: CPT | Performed by: INTERNAL MEDICINE

## 2019-12-16 PROCEDURE — 25010000002 KETOROLAC TROMETHAMINE PER 15 MG: Performed by: INTERNAL MEDICINE

## 2019-12-16 PROCEDURE — 75561 CARDIAC MRI FOR MORPH W/DYE: CPT

## 2019-12-16 PROCEDURE — 86777 TOXOPLASMA ANTIBODY: CPT | Performed by: INTERNAL MEDICINE

## 2019-12-16 PROCEDURE — 0 GADOBENATE DIMEGLUMINE 529 MG/ML SOLUTION: Performed by: INTERNAL MEDICINE

## 2019-12-16 RX ORDER — AZITHROMYCIN 250 MG/1
500 TABLET, FILM COATED ORAL
Status: DISCONTINUED | OUTPATIENT
Start: 2019-12-16 | End: 2019-12-20 | Stop reason: HOSPADM

## 2019-12-16 RX ORDER — HYDROCODONE BITARTRATE AND ACETAMINOPHEN 5; 325 MG/1; MG/1
1 TABLET ORAL EVERY 6 HOURS PRN
Status: DISCONTINUED | OUTPATIENT
Start: 2019-12-16 | End: 2019-12-20 | Stop reason: HOSPADM

## 2019-12-16 RX ORDER — MORPHINE SULFATE 2 MG/ML
2 INJECTION, SOLUTION INTRAMUSCULAR; INTRAVENOUS ONCE
Status: COMPLETED | OUTPATIENT
Start: 2019-12-16 | End: 2019-12-16

## 2019-12-16 RX ORDER — AZITHROMYCIN 250 MG/1
500 TABLET, FILM COATED ORAL
Status: DISCONTINUED | OUTPATIENT
Start: 2019-12-16 | End: 2019-12-16

## 2019-12-16 RX ADMIN — SODIUM CHLORIDE, PRESERVATIVE FREE 10 ML: 5 INJECTION INTRAVENOUS at 08:56

## 2019-12-16 RX ADMIN — GADOBENATE DIMEGLUMINE 19 ML: 529 INJECTION, SOLUTION INTRAVENOUS at 16:15

## 2019-12-16 RX ADMIN — KETOROLAC TROMETHAMINE 30 MG: 30 INJECTION, SOLUTION INTRAMUSCULAR; INTRAVENOUS at 08:57

## 2019-12-16 RX ADMIN — COLCHICINE 0.6 MG: 0.6 TABLET, FILM COATED ORAL at 08:57

## 2019-12-16 RX ADMIN — HYDROCODONE BITARTRATE AND ACETAMINOPHEN 1 TABLET: 5; 325 TABLET ORAL at 18:11

## 2019-12-16 RX ADMIN — KETOROLAC TROMETHAMINE 30 MG: 30 INJECTION, SOLUTION INTRAMUSCULAR; INTRAVENOUS at 23:00

## 2019-12-16 RX ADMIN — MORPHINE SULFATE 2 MG: 2 INJECTION, SOLUTION INTRAMUSCULAR; INTRAVENOUS at 06:18

## 2019-12-16 RX ADMIN — INFLUENZA VIRUS VACCINE 0.5 ML: 15; 15; 15; 15 SUSPENSION INTRAMUSCULAR at 08:57

## 2019-12-16 RX ADMIN — AZITHROMYCIN 500 MG: 250 TABLET, FILM COATED ORAL at 17:51

## 2019-12-16 NOTE — PLAN OF CARE
VSS on room air overnight.  Scheduled toradol given for pain.  Pt rested well and without complaints for majority of shift.  EKG obtained this morning and in pt's chart.  No acute distress noted at this time.

## 2019-12-16 NOTE — PROGRESS NOTES
"CARDIOLOGY PROGRESS NOTE           12/16/2019 7:51 AM    Admit Date: 12/15/2019    Admit Diagnosis: Acute pericarditis, unspecified type [I30.9]    Chief Complaint: Follow up for Pericarditis    Subjective:     Nurses report \"rapid response\" for chest pain this morning. Received MS. Bush now with no SOA.          Objective:     Vitals:    12/16/19 0616 12/16/19 0618 12/16/19 0621 12/16/19 0624   BP: 106/60      BP Location:       Patient Position:       Pulse:  (!) 42 56 (!) 42   Resp:       Temp:       TempSrc:       SpO2:  99% 100% 99%   Weight:       Height:           Physical Exam:    Eyes - PERRLA  Neck- supple, No mass  CV- regular rate and rhythm, bradycardia. No rub or gallop. No murmur.  Lung- clear bilaterally  Abd- soft, +BS  Musc/skel - Norm strength and range of motion  Skin- warm and dry  Neuro - Alert & Oriented x 3, appropriate mood.        Current Facility-Administered Medications:   •  colchicine tablet 0.6 mg, 0.6 mg, Oral, Q12H, RenaSaturnino suttonua A, APRN, 0.6 mg at 12/15/19 2212  •  Influenza Vac Subunit Quad (FLUCELVAX) injection 0.5 mL, 0.5 mL, Intramuscular, Once, Soila Shi MD  •  ketorolac (TORADOL) injection 30 mg, 30 mg, Intravenous, BID, Fredi Cervantes MD, 30 mg at 12/15/19 2212  •  Morphine sulfate (PF) injection 4 mg, 4 mg, Intravenous, Once, Riccardo Ramos MD, Stopped at 12/15/19 1322  •  sodium chloride 0.9 % flush 10 mL, 10 mL, Intravenous, PRN, Riccardo Ramos MD  •  sodium chloride 0.9 % flush 10 mL, 10 mL, Intravenous, Q12H, Jona Chase A, APRN, 10 mL at 12/15/19 2212  •  sodium chloride 0.9 % flush 10 mL, 10 mL, Intravenous, PRN, Rena, Jona A, APRN        Echocardiogram 12/15/19  · Cardiac chamber sizes and wall thicknesses are normal.  · Global and segmental LV systolic function is normal. The estimated left ventricular ejection fraction is 70%.  · The aortic and mitral valves are normal in terms of both structure and function.  · There is trace to " "mild tricuspid regurgitation with a normal estimated RV systolic pressure.  There is a \"trivial\" pericardial effusion.    EKG this AM with CRISTY, really unchanged since 12/15/19.    Assessment:     At this time, the best diagnosis is MYOCARDITIS with chest pain, fairly generalized CRISTY, low grade troponin release (it has increased a bit since 12/15), positive inflammatory markers, arrhythmia (IDB) in ER, NORMAL echo LV function and no pericardial fluid. My plan:    - pain relief with NSAIDs, opoids as needed; continue colchicine for now.  - CMR today.  - I spoke with ANGELA Duque MD who has agreed to see for serologic evaluation.  - Will mine ER records for ? NSVT from yesterday.    Plan:       Electronically signed by RILEY Land, 12/16/19, 7:59 AM.    "

## 2019-12-16 NOTE — PLAN OF CARE
Pt c/o chest pain/pressure (bilat upper sternum) at 0600.  Rapid response called.  EKG obtained, V/S showed low BP, bradycradia (pt's baseline since arrival yesterday).  EKG showed changes, Pt given 2 mg morphine IV and pain subsided per pt.  Cardiology on-call PA Dr SETH Zepeda, and Dr Cervantes notified of the EKG changes and pt current status.

## 2019-12-16 NOTE — PAYOR COMM NOTE
"Lisa Marques RN  Utilization Review  P: [phone]  F: [phone]    Member ID = [id number]  Initial notification & clinicals for inpatient Vamshi Pradhan (19 y.o. Male)     Date of Birth Social Security Number Address Home Phone MRN    2000  [address] [place] [address] [phone] [id number]    [employer] Status          None Single       Admission Date Admission Type Admitting Provider Attending Provider Department, Room/Bed    12/15/19 Emergency Soila Shi MD Seward, Kathryn L, MD [facility] 4G, S459/1    Discharge Date Discharge Disposition Discharge Destination                       Attending Provider:  Esthela Kat MD    Allergies:  No Known Allergies    Isolation:  None   Infection:  None   Code Status:  CPR    Ht:  182.9 cm (72\")   Wt:  80.5 kg (177 lb 6.4 oz)    Admission Cmt:  None   Principal Problem:  None                Active Insurance as of 12/15/2019     Primary Coverage     Payor Plan Insurance Group Employer/Plan Group    [employer] [place] [employer] [place]      Payor Plan Address Payor Plan Phone Number Payor Plan Fax Number Effective Dates    [address] [address] [phone]  2019 - None Entered    [facility] [address]       Subscriber Name Subscriber Birth Date Member ID       VAMSHI AUGUST 2000 [id number]                 Emergency Contacts      (Rel.) Home Phone Work Phone Mobile Phone    Lucie August (Mother) [phone] -- --               History & Physical      Soila Shi MD at 12/15/19 [address] [place] [address] Medicine Services  HISTORY AND PHYSICAL    Patient Name: Vamshi August  : 2000  MRN: [id number]  Primary Care Physician: Shayy Aguilar APRN    Subjective   Subjective     Chief Complaint:  Acute pericarditis, unspecified type [I30.9]    HPI:  Vamshi August is a 19 y.o. male with no known " "significant medical history who was admitted from the ED for idiopathic acute pericarditis. Approximately  4 days ago he began to have symptoms of sore throat with tonsillar exudate, general malaise, and fatigue. At this point he received a prescription for amoxicillin from a \"friend\" who has prescriptive authority who thought it looked to be strep throat. He has currently taken the amoxicillin for 3 days total. After taking the amoxicillin for 2 days his sore throat and malaise improved. When he awoke this morning he noticed that he didn't feel well, he shortly began to experience severe chest pain, shortness of breath, palpitations, and lightheadedness. He denies fainting, but he felt like he was going to. At this point he decided to come to the emergency department for evaluation. In ED he was noted to have ST segment abnormalities on EKG. Cardiologist Dr. Cervantes was consulted and performed an echocardiogram at the bedside who ruled out suspicion of an acute MI. No tamponade noted. He reports that his symptoms are much improved at this time following a dose of Toradol in the ED. He denies any recent travel or recent sick contacts. He will be admitted to telemetry for further observation.     He was also recently diagnosed and treated for chlamydia with Zithromax 1gram. He confirms that he took the medication as prescribed by urology.     ROS:   Review of Systems   Constitutional: Positive for diaphoresis. Negative for activity change and fever.   HENT: Negative for trouble swallowing and voice change.    Eyes: Negative for photophobia and visual disturbance.   Respiratory: Positive for chest tightness and shortness of breath.    Cardiovascular: Positive for chest pain and palpitations.   Gastrointestinal: Negative for abdominal distention and abdominal pain.   Endocrine: Negative.    Genitourinary: Negative for difficulty urinating and frequency.   Musculoskeletal: Negative for gait problem and myalgias.   Skin: " Negative for pallor and rash.   Allergic/Immunologic: Negative.    Neurological: Negative for dizziness, speech difficulty, weakness and numbness.   Hematological: Negative.    Psychiatric/Behavioral: Negative for agitation and confusion.     Otherwise complete ROS reviewed and is negative except as mentioned in the HPI.    Personal History     Past Medical History:   Diagnosis Date   • Known health problems: none        Past Surgical History:   Procedure Laterality Date   • DENTAL PROCEDURE     • WISDOM TOOTH EXTRACTION         Family History: family history includes No Known Problems in his brother, father, mother, and sister. Otherwise pertinent FHx was reviewed and unremarkable.     Social History:  reports that he has quit smoking. His smoking use included electronic cigarette. He has never used smokeless tobacco. He reports that he drinks alcohol. He reports that he does not use drugs.  Social History     Social History Narrative    Current college student, lives in house with 4 roommates.        Medications:    Available home medications reviewed.   Medications Prior to Admission   Medication Sig Dispense Refill Last Dose   • amoxicillin (AMOXIL) 500 MG capsule Take 500 mg by mouth 2 (Two) Times a Day.      • ibuprofen (ADVIL,MOTRIN) 200 MG tablet Take 800 mg by mouth Every 6 (Six) Hours As Needed for Mild Pain .          No Known Allergies    Objective   Objective     Vital Signs:   Temp:  [97.7 °F (36.5 °C)] 97.7 °F (36.5 °C)  Heart Rate:  [44-51] 44  Resp:  [18] 18  BP: ()/(47-72) 119/66        Physical Exam:   Constitutional: No acute distress, awake, alert  Eyes: PERRL  HENT: NCAT, mucous membranes moist, petechia noted on posterior oropharynx   Neck: Supple, no thyromegaly, no lymphadenopathy  Respiratory: Clear to auscultation bilaterally, good effort, nonlabored respirations   Cardiovascular: RRR, no murmur  Gastrointestinal: Positive bowel sounds, soft, nontender, nondistended  Musculoskeletal:  No peripheral edema, normal muscle tone for age  Psychiatric: Appropriate affect, good insight and judgement, cooperative  Neurologic: Oriented x 3, movements symmetric BUE and BLE, speech clear and fluent  Skin: warm, dry, intact    Results Reviewed:  I have personally reviewed current lab, radiology, and data and agree.    Results from last 7 days   Lab Units 12/15/19  1158   WBC 10*3/mm3 8.16   HEMOGLOBIN g/dL 13.8   HEMATOCRIT % 42.5   PLATELETS 10*3/mm3 197     Results from last 7 days   Lab Units 12/15/19  1158   SODIUM mmol/L 141   POTASSIUM mmol/L 4.0   CHLORIDE mmol/L 104   CO2 mmol/L 24.0   BUN mg/dL 15   CREATININE mg/dL 1.07   GLUCOSE mg/dL 120*   CALCIUM mg/dL 9.1   ALT (SGPT) U/L 25   AST (SGOT) U/L 48*     Estimated Creatinine Clearance: 125.4 mL/min (by C-G formula based on SCr of 1.07 mg/dL).  Brief Urine Lab Results  (Last result in the past 365 days)      Color   Clarity   Blood   Leuk Est   Nitrite   Protein   CREAT   Urine HCG        12/09/19 1247 Dark Yellow Clear Negative Negative Negative Negative             No results found for: BNP  Imaging Results (Last 24 Hours)     Procedure Component Value Units Date/Time    XR Chest 1 View [[id number]] Collected:  12/15/19 1246     Updated:  12/15/19 1247    Narrative:          EXAMINATION: XR CHEST 1 VW-      INDICATION: Chest Pain triage protocol      COMPARISON: NONE     FINDINGS: There is a normal cardiac silhouette. There is no pulmonary  inflammatory process, mass or effusion.           Impression:       No active disease              Results for orders placed during the hospital encounter of 12/15/19   Adult Transthoracic Echo Complete W/ Cont if Necessary Per Protocol    Narrative · Cardiac chamber sizes and wall thicknesses are normal.  · Global and segmental LV systolic function is normal. The estimated left   ventricular ejection fraction is 70%.  · The aortic and mitral valves are normal in terms of both structure and   function.  · There  "is trace to mild tricuspid regurgitation with a normal estimated   RV systolic pressure.  · There is a \"trivial\" pericardial effusion.          Assessment/Plan   Assessment / Plan     Active Hospital Problems    Diagnosis POA   • **Acute pericarditis [I30.9] Yes     Vamshi August is a 19 y.o. male with no known significant medical history who was admitted from the ED for idiopathic acute pericarditis.    Acute pericarditis, idiopathic  --rapid strep negative  --throat cultures pending  --blood cultures pending  --EBV titer ordered  --cardiology following  --Echo performed in ED  --Received rocephin 1gm IV in ED, will hold off on further ABX for now pending cultures  --IV Toradol BID for 24-48 hours per cards then transition to PO NSAIDS  --Colchicine 0.6mg BID      DVT prophylaxis: Ambulation    CODE STATUS:    Code Status and Medical Interventions:   Ordered at: 12/15/19 1716     Level Of Support Discussed With:    Patient     Code Status:    CPR     Medical Interventions (Level of Support Prior to Arrest):    Full     OBSERVATION status, however if further evaluation or treatment plans warrant, status may change.  Based upon current information, I predict patient's care encounter to be less than or equal to 2 midnights.    Electronically signed by SUSHMA Diego, 12/15/19, 5:16 PM.      Attending   Admission Attestation       I have seen and examined the patient, performing an independent face-to-face diagnostic evaluation with plan of care reviewed and developed with the advanced practice clinician (APC).      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  Earlier in the week, he noted he had a sore throat with exudative patches and had a friend prescribe " amoxicillin for him.  His throat improved quickly and he was feeling well last night.  Upon arrival to the ER, he was noted to have ST elevation and bedside echo confirmed pericarditis.  There was no tamponade.  He has been started on IV toradol and reports his symptoms have improved but are still present.      Of note, he was recently treated by urology for a +chlamydia test with 1 GM azithromycin.      Remainder of detailed HPI is as noted by APC and has been reviewed and/or edited by me for completeness.    Attending Physical Exam:  Constitutional: Awake, alert, pleasant, comfortable   Eyes: PERRLA, sclerae anicteric, no conjunctival injection  HENT: NCAT, mucous membranes moist  Neck: Supple, no thyromegaly, no lymphadenopathy, trachea midline  Respiratory: Clear to auscultation bilaterally, nonlabored respirations   Cardiovascular: RRR, no murmurs, rubs, or gallops, palpable pedal pulses bilaterally  Gastrointestinal: Positive bowel sounds, soft, nontender, nondistended  Musculoskeletal: No bilateral ankle edema, no clubbing or cyanosis to extremities  Psychiatric: Appropriate affect, cooperative  Neurologic: Oriented x 3, strength symmetric in all extremities, Cranial Nerves grossly intact to confrontation, speech clear  Skin: No rashes      Brief Assessment/Plan :  See detailed assessment and plan developed with APC which I have reviewed and/or edited for completeness.    Electronically signed by Soila Shi MD, 12/15/19, 8:40 PM.                Electronically signed by Soila Shi MD at 12/15/19 2044       Vital Signs (last 2 days)     Date/Time   Temp   Temp src   Pulse   Resp   BP   Patient Position   SpO2    12/16/19 1121   98.7 (37.1)   Oral   56   18   99/56   Sitting   95    12/16/19 0825   98.8 (37.1)   Oral   53   18   105/62   Lying   94    12/16/19 0624   --   --   (!) 42   --   --   --   99    12/16/19 0621   --   --   56   --   --   --   100    12/16/19 0618   --   --   (!) 42   --    --   --   99    12/16/19 0616   --   --   --   --   106/60   --   --    12/16/19 0615   --   --   (!) 42   --   (!) 86/72   --   100    12/16/19 0612   --   --   (!) 47   --   --   --   98    12/16/19 0610   --   --   (!) 45   --   108/57   Lying   100    12/16/19 0606   --   --   (!) 47   --   --   --   99    12/16/19 0605   --   --   (!) 44   --   102/57   Lying   100    12/16/19 0604   --   --   (!) 45   --   --   --   --    12/16/19 0603   --   --   --   --   111/60   --   98    12/16/19 0601   --   --   (!) 44   --   112/57   --   100    12/16/19 0600   --   --   (!) 44   --   --   --   99    12/16/19 0450   98.4 (36.9)   Oral   55   18   113/70   Lying   98    12/15/19 2346   98.9 (37.2)   Oral   51   18   95/43   Lying   94    12/15/19 1920   98.4 (36.9)   Axillary   54   18   113/64   Lying   100    12/15/19 1647   --   --   --   --   119/66   Sitting   --    12/15/19 1645   --   --   (!) 44   18   109/72   Sitting   --    12/15/19 1259   --   --   51   --   94/47   --   99    12/15/19 1215   --   --   (!) 46   --   99/64   --   100    12/15/19 1200   --   --   (!) 47   --   107/66   --   99    12/15/19 1137   97.7 (36.5)   Oral   50   18   100/57   Sitting   100            Hospital Medications (all)       Dose Frequency Start End    aspirin chewable tablet 324 mg (Completed) 324 mg Once 12/15/2019 12/15/2019    Admin Instructions: Herbal/drug interaction: Avoid use with ginkgo biloba.<BR>Do not exceed 4 grams of aspirin in a 24 hr period.<BR><BR>If given for pain, use the following pain scale: <BR>Mild Pain = Pain Score of 1-3, CPOT 1-2<BR>Moderate Pain = Pain Score of 4-6, CPOT 3-4<BR>Severe Pain = Pain Score of 7-10, CPOT 5-8    Route: Oral    azithromycin (ZITHROMAX) tablet 500 mg 500 mg Every 24 Hours Scheduled 12/16/2019 12/23/2019    Admin Instructions: Do not administer concurrently with aluminum or magnesium antacids.  Take with food if GI upset occurs.    Route: Oral    cefTRIAXone (ROCEPHIN) 1  g/100 mL 0.9% NS (MBP) (Completed) 1 g Once 12/15/2019 12/15/2019    Admin Instructions: Caution: Look alike/sound alike drug alert. Break seal and mix to activiate vial before use.    Route: Intravenous    colchicine tablet 0.6 mg 0.6 mg Every 12 Hours Scheduled 12/15/2019     Route: Oral    Influenza Vac Subunit Quad (FLUCELVAX) injection 0.5 mL (Completed) 0.5 mL Once 12/16/2019 12/16/2019    Admin Instructions: **Do Not Administer if Temperature Greater Than 102F & Notify Pharmacy** Pneumococcal & Influenza Vaccines May Be Given at the Same Time in SEPARATE Injections.    Route: Intramuscular    ketorolac (TORADOL) injection 30 mg (Completed) 30 mg Once 12/15/2019 12/15/2019    Admin Instructions: <BR><BR>If given for pain, use the following pain scale:<BR>Mild Pain = Pain Score of 1-3, CPOT 1-2<BR>Moderate Pain = Pain Score of 4-6, CPOT 3-4<BR>Severe Pain = Pain Score of 7-10, CPOT 5-8    Route: Intravenous    ketorolac (TORADOL) injection 30 mg 30 mg 2 Times Daily 12/15/2019 12/20/2019    Admin Instructions: <BR><BR>If given for pain, use the following pain scale:<BR>Mild Pain = Pain Score of 1-3, CPOT 1-2<BR>Moderate Pain = Pain Score of 4-6, CPOT 3-4<BR>Severe Pain = Pain Score of 7-10, CPOT 5-8    Route: Intravenous    morphine injection 2 mg (Completed) 2 mg Once 12/16/2019 12/16/2019    Admin Instructions: If given for pain, use the following pain scale:<BR>Mild Pain = Pain Score of 1-3, CPOT 1-2<BR>Moderate Pain = Pain Score of 4-6, CPOT 3-4<BR>Severe Pain = Pain Score of 7-10, CPOT 5-8    Route: Intravenous    Morphine sulfate (PF) injection 4 mg 4 mg Once 12/15/2019     Admin Instructions: <BR><BR>Caution: Look alike/sound alike drug alert<BR><BR>If given for pain, use the following pain scale:<BR>Mild Pain = Pain Score of 1-3, CPOT 1-2<BR>Moderate Pain = Pain Score of 4-6, CPOT 3-4<BR>Severe Pain = Pain Score of 7-10, CPOT 5-8    Route: Intravenous    ondansetron (ZOFRAN) injection 4 mg (Completed) 4  mg Once 12/15/2019 12/15/2019    Route: Intravenous    sodium chloride 0.9 % bolus 1,000 mL (Completed) 1,000 mL Once 12/15/2019 12/15/2019    Route: Intravenous    sodium chloride 0.9 % bolus 1,000 mL (Completed) 1,000 mL Once 12/15/2019 12/15/2019    Route: Intravenous    sodium chloride 0.9 % flush 10 mL 10 mL As Needed 12/15/2019     Route: Intravenous    Cosign for Ordering: Accepted by Riccardo Ramos MD on 12/15/2019  2:07 PM    sodium chloride 0.9 % flush 10 mL 10 mL Every 12 Hours Scheduled 12/15/2019     Route: Intravenous    sodium chloride 0.9 % flush 10 mL 10 mL As Needed 12/15/2019     Route: Intravenous    aspirin chewable tablet 324 mg (Discontinued) 324 mg Once 12/15/2019 12/15/2019    Admin Instructions: Herbal/drug interaction: Avoid use with ginkgo biloba.<BR>Do not exceed 4 grams of aspirin in a 24 hr period.<BR><BR>If given for pain, use the following pain scale: <BR>Mild Pain = Pain Score of 1-3, CPOT 1-2<BR>Moderate Pain = Pain Score of 4-6, CPOT 3-4<BR>Severe Pain = Pain Score of 7-10, CPOT 5-8    Route: Oral    Cosign for Ordering: Accepted by Riccardo Ramos MD on 12/15/2019  2:07 PM    azithromycin (ZITHROMAX) tablet 500 mg (Discontinued) 500 mg Every 24 Hours Scheduled 12/16/2019 12/16/2019    Admin Instructions: Do not administer concurrently with aluminum or magnesium antacids.  Take with food if GI upset occurs.    Route: Oral    Influenza Vac Subunit Quad (FLUCELVAX) injection 0.5 mL (Discontinued) 0.5 mL Once 12/15/2019 12/15/2019    Admin Instructions: **Do Not Administer if Temperature Greater Than 102F & Notify Pharmacy** Pneumococcal & Influenza Vaccines May Be Given at the Same Time in SEPARATE Injections.    Route: Intramuscular    Cosign for Ordering: Accepted by Soila Shi MD on 12/15/2019  8:39 PM          Lab Results (all)     Procedure Component Value Units Date/Time    Blood Culture - Blood, Arm, Right [[id number]] Collected:  12/15/19 1300    Specimen:   Blood from Arm, Right Updated:  12/16/19 1316     Blood Culture No growth at 24 hours    Blood Culture - Blood, Arm, Right [[id number]] Collected:  12/15/19 1300    Specimen:  Blood from Arm, Right Updated:  12/16/19 1316     Blood Culture No growth at 24 hours    Beta Strep Culture, Throat - Swab, Throat [[id number]]  (Normal) Collected:  12/15/19 1210    Specimen:  Swab from Throat Updated:  12/16/19 1225     Throat Culture, Beta Strep No Beta Hemolytic Streptococcus Isolated    Narrative:       Group A Strep incidence is low in adults. Positive culture for Beta hemolytic Streptococcus species can reflect colonization and not true infection. Please correlate clinically.    Troponin [[id number]]  (Abnormal) Collected:  12/16/19 1028    Specimen:  Blood Updated:  12/16/19 1119     Troponin T 1.100 ng/mL     Narrative:       Troponin T Reference Range:  <= 0.03 ng/mL-   Negative for AMI  >0.03 ng/mL-     Abnormal for myocardial necrosis.  Clinicians would have to utilize clinical acumen, EKG, Troponin and serial changes to determine if it is an Acute Myocardial Infarction or myocardial injury due to an underlying chronic condition.     Basic Metabolic Panel [[id number]]  (Abnormal) Collected:  12/16/19 0500    Specimen:  Blood Updated:  12/16/19 0703     Glucose 106 mg/dL      BUN 14 mg/dL      Creatinine 0.83 mg/dL      Sodium 139 mmol/L      Potassium 4.0 mmol/L      Chloride 107 mmol/L      CO2 23.0 mmol/L      Calcium 8.6 mg/dL      eGFR Non African Amer 118 mL/min/1.73      BUN/Creatinine Ratio 16.9     Anion Gap 9.0 mmol/L     Narrative:       GFR Normal >60  Chronic Kidney Disease <60  Kidney Failure <15      Troponin [[id number]]  (Abnormal) Collected:  12/16/19 0500    Specimen:  Blood Updated:  12/16/19 0649     Troponin T 0.708 ng/mL     Narrative:       Troponin T Reference Range:  <= 0.03 ng/mL-   Negative for AMI  >0.03 ng/mL-     Abnormal for myocardial necrosis.  Clinicians would have to utilize clinical  acumen, EKG, Troponin and serial changes to determine if it is an Acute Myocardial Infarction or myocardial injury due to an underlying chronic condition.     CBC (No Diff) [[id number]]  (Abnormal) Collected:  12/16/19 0500    Specimen:  Blood Updated:  12/16/19 0556     WBC 7.57 10*3/mm3      RBC 3.92 10*6/mm3      Hemoglobin 12.1 g/dL      Hematocrit 36.9 %      MCV 94.1 fL      MCH 30.9 pg      MCHC 32.8 g/dL      RDW 12.1 %      RDW-SD 41.8 fl      MPV 11.3 fL      Platelets 236 10*3/mm3     [place]-Barr Virus VCA Antibody Panel [[id number]] Collected:  12/15/19 1158    Specimen:  Blood Updated:  12/15/19 1801    [place] Draw [[id number]] Collected:  12/15/19 1158    Specimen:  Blood Updated:  12/15/19 1555    Narrative:       The following orders were created for panel order [place] Draw.  Procedure                               Abnormality         Status                     ---------                               -----------         ------                     Light Blue Top[[id number]]                                   Final result               Green Top (Gel)[[id number]]                                  Final result               Lavender Top[[id number]]                                     Final result               Gold Top - SST[[id number]]                                   Final result               Green Top (No Gel)[[id number]]                                                            Please view results for these tests on the individual orders.    Troponin [[id number]]  (Abnormal) Collected:  12/15/19 1354    Specimen:  Blood Updated:  12/15/19 1429     Troponin T 0.398 ng/mL     Narrative:       Troponin T Reference Range:  <= 0.03 ng/mL-   Negative for AMI  >0.03 ng/mL-     Abnormal for myocardial necrosis.  Clinicians would have to utilize clinical acumen, EKG, Troponin and serial changes to determine if it is an Acute Myocardial Infarction or myocardial injury due to an underlying chronic condition.     CK  [[id number]]  (Normal) Collected:  12/15/19 1158    Specimen:  Blood Updated:  12/15/19 1334     Creatine Kinase 139 U/L     CK-MB [[id number]]  (Abnormal) Collected:  12/15/19 1158    Specimen:  Blood Updated:  12/15/19 1334     CKMB 14.11 ng/mL     Lactic Acid, Plasma [[id number]]  (Normal) Collected:  12/15/19 1300    Specimen:  Blood Updated:  12/15/19 1329     Lactate 1.3 mmol/L      Comment: Falsely depressed results may occur on samples drawn from patients receiving N-Acetylcysteine (NAC) or Metamizole.       Light Blue Top [[id number]] Collected:  12/15/19 1158    Specimen:  Blood Updated:  12/15/19 1300     Extra Tube hold for add-on     Comment: Auto resulted       Green Top (Gel) [[id number]] Collected:  12/15/19 1158    Specimen:  Blood Updated:  12/15/19 1300     Extra Tube Hold for add-ons.     Comment: Auto resulted.       Gold Top - SST [[id number]] Collected:  12/15/19 1158    Specimen:  Blood Updated:  12/15/19 1300     Extra Tube Hold for add-ons.     Comment: Auto resulted.       Lavender Top [[id number]] Collected:  12/15/19 1158    Specimen:  Blood Updated:  12/15/19 1300     Extra Tube hold for add-on     Comment: Auto resulted       C-reactive Protein [[id number]]  (Abnormal) Collected:  12/15/19 1158    Specimen:  Blood Updated:  12/15/19 1248     C-Reactive Protein 12.03 mg/dL     Rapid Strep A Screen - Swab, Throat [[id number]]  (Normal) Collected:  12/15/19 1210    Specimen:  Swab from Throat Updated:  12/15/19 1241     Strep A Ag Negative    Narrative:       Test performed by Direct Antigen Testing.    Troponin [[id number]]  (Abnormal) Collected:  12/15/19 1158    Specimen:  Blood Updated:  12/15/19 1239     Troponin T 0.278 ng/mL     Narrative:       Troponin T Reference Range:  <= 0.03 ng/mL-   Negative for AMI  >0.03 ng/mL-     Abnormal for myocardial necrosis.  Clinicians would have to utilize clinical acumen, EKG, Troponin and serial changes to determine if it is an Acute Myocardial  Infarction or myocardial injury due to an underlying chronic condition.     Comprehensive Metabolic Panel [[id number]]  (Abnormal) Collected:  12/15/19 1158    Specimen:  Blood Updated:  12/15/19 1237     Glucose 120 mg/dL      BUN 15 mg/dL      Creatinine 1.07 mg/dL      Sodium 141 mmol/L      Potassium 4.0 mmol/L      Chloride 104 mmol/L      CO2 24.0 mmol/L      Calcium 9.1 mg/dL      Total Protein 7.0 g/dL      Albumin 3.60 g/dL      ALT (SGPT) 25 U/L      AST (SGOT) 48 U/L      Alkaline Phosphatase 130 U/L      Total Bilirubin 0.4 mg/dL      eGFR Non African Amer 88 mL/min/1.73      Globulin 3.4 gm/dL      A/G Ratio 1.1 g/dL      BUN/Creatinine Ratio 14.0     Anion Gap 13.0 mmol/L     Narrative:       GFR Normal >60  Chronic Kidney Disease <60  Kidney Failure <15      Lipase [[id number]]  (Normal) Collected:  12/15/19 1158    Specimen:  Blood Updated:  12/15/19 1237     Lipase 14 U/L     Sedimentation Rate [[id number]]  (Abnormal) Collected:  12/15/19 1158    Specimen:  Blood Updated:  12/15/19 1236     Sed Rate 17 mm/hr     BNP [[id number]]  (Normal) Collected:  12/15/19 1158    Specimen:  Blood Updated:  12/15/19 1235     proBNP 204.5 pg/mL     Narrative:       Among patients with dyspnea, NT-proBNP is highly sensitive for the detection of acute congestive heart failure. In addition NT-proBNP of <300 pg/ml effectively rules out acute congestive heart failure with 99% negative predictive value.      CBC & Differential [[id number]] Collected:  12/15/19 1158    Specimen:  Blood Updated:  12/15/19 1210    Narrative:       The following orders were created for panel order CBC & Differential.  Procedure                               Abnormality         Status                     ---------                               -----------         ------                     CBC Auto Differential[[id number]]        Abnormal            Final result                 Please view results for these tests on the individual orders.    CBC  Auto Differential [[id number]]  (Abnormal) Collected:  12/15/19 1158    Specimen:  Blood Updated:  12/15/19 1210     WBC 8.16 10*3/mm3      RBC 4.55 10*6/mm3      Hemoglobin 13.8 g/dL      Hematocrit 42.5 %      MCV 93.4 fL      MCH 30.3 pg      MCHC 32.5 g/dL      RDW 11.9 %      RDW-SD 40.8 fl      MPV 10.9 fL      Platelets 197 10*3/mm3      Neutrophil % 47.0 %      Lymphocyte % 37.1 %      Monocyte % 11.5 %      Eosinophil % 3.8 %      Basophil % 0.5 %      Immature Grans % 0.1 %      Neutrophils, Absolute 3.83 10*3/mm3      Lymphocytes, Absolute 3.03 10*3/mm3      Monocytes, Absolute 0.94 10*3/mm3      Eosinophils, Absolute 0.31 10*3/mm3      Basophils, Absolute 0.04 10*3/mm3      Immature Grans, Absolute 0.01 10*3/mm3      nRBC 0.0 /100 WBC           Imaging Results (All)     Procedure Component Value Units Date/Time    XR Chest 1 View [[id number]] Collected:  12/15/19 1246     Updated:  12/15/19 1742    Narrative:          EXAMINATION: XR CHEST 1 VW-      INDICATION: Chest Pain triage protocol      COMPARISON: NONE     FINDINGS: There is a normal cardiac silhouette. There is no pulmonary  inflammatory process, mass or effusion.           Impression:       No active disease     This report was finalized on 12/15/2019 5:38 PM by Dr. Henok Rajan MD.             ECG/EMG Results (all)     Procedure Component Value Units Date/Time    ECG 12 Lead [[id number]] Collected:  12/15/19 1349     Updated:  12/15/19 1441    Narrative:       Test Reason : 2ND SET  Blood Pressure : **/** mmHG  Vent. Rate : 049 BPM     Atrial Rate : 049 BPM     P-R Int : 110 ms          QRS Dur : 094 ms      QT Int : 420 ms       P-R-T Axes : 012 070 067 degrees     QTc Int : 379 ms    Marked sinus bradycardia with short ME  ST elevation, consider inferolateral injury or acute infarct    Abnormal ECG  When compared with ECG of 15-DEC-2019 11:51, (Unconfirmed)  Sinus rhythm  Confirmed by ROSEMARY BEACH, SHIRLEY ([id number]) on 12/15/2019 2:41:07 PM    Referred  By:  KE BEACH           Confirmed By:SHIRLEY RILEY MD    ECG 12 Lead [[id number]] Collected:  12/15/19 1151     Updated:  12/15/19 1441    Narrative:       Test Reason : PALPITATIONS  Blood Pressure : **/** mmHG  Vent. Rate : 049 BPM     Atrial Rate : 043 BPM     P-R Int : 000 ms          QRS Dur : 096 ms      QT Int : 420 ms       P-R-T Axes : 000 070 069 degrees     QTc Int : 379 ms    Sinus Bradycardia  ST elevation, consider inferior injury or acute infarct  No reciporcal changes.  Abnormal ECG  No previous ECGs available  Confirmed by SHIRLEY RILEY MD ([id number]) on 12/15/2019 2:41:33 PM    Referred By: MD DEMPSEY           Confirmed By:SHIRLEY RILEY MD    Adult Transthoracic Echo Complete W/ Cont if Necessary Per Protocol [[id number]] Collected:  12/15/19 1457     Updated:  12/15/19 1613     BSA 2.0 m^2      IVSd 0.91 cm      LVIDd 4.8 cm      LVIDs 3.3 cm      LVPWd 0.95 cm      IVS/LVPW 0.96     FS 30.8 %      EDV(Teich) 106.5 ml      ESV(Teich) 44.5 ml      EF(Teich) 58.2 %      EDV(cubed) 109.2 ml      ESV(cubed) 36.3 ml      EF(cubed) 66.8 %      LV mass(C)d 153.3 grams      LV mass(C)dI 76.2 grams/m^2      SV(Teich) 62.0 ml      SI(Teich) 30.8 ml/m^2      SV(cubed) 73.0 ml      SI(cubed) 36.2 ml/m^2      Ao root diam 2.6 cm      Ao root area 5.3 cm^2      LA dimension 3.5 cm      LA/Ao 1.3     LAd major 5.4 cm      LA volume 41.0 ml      Ao root area (BSA corrected) 1.3     LA Volume Index 20.4 ml/m^2      MV E max dipti 80.9 cm/sec      MV A max dipti 25.2 cm/sec      MV E/A 3.2     MV P1/2t max dipti 96.5 cm/sec      MV P1/2t 111.1 msec      MVA(P1/2t) 2.0 cm^2      MV dec slope 254.5 cm/sec^2      MV dec time 0.19 sec      PA acc slope 417.0 cm/sec^2      PA acc time 0.15 sec      PI end-d dipti 90.3 cm/sec      RV V1 max PG 0.77 mmHg      RV V1 max 43.9 cm/sec      TR max dipti 258.0 cm/sec       CV ECHO MARIE - TR MAX PG 27.0 mmHg      RVSP(TR) 30.0 mmHg      RAP systole 3.0 mmHg      PA pr(Accel) 11.1 mmHg      MVA P1/2T  "LCG 2.3 cm^2      Lat E/e'  5.1     Med E/e' 4.8     Lat Peak E' Irvin 16.0 cm/sec      Med Peak E' Irvin 17.0 cm/sec       CV ECHO MARIE - BZI_BMI 23.7 kilograms/m^2       CV ECHO MARIE - BSA(HAYCOCK) 2.0 m^2       CV ECHO MARIE - BZI_METRIC_WEIGHT 79.4 kg       CV ECHO MARIE - BZI_METRIC_HEIGHT 182.9 cm      Avg E/e' ratio 4.90     TDI S' 11.70 cm/sec      RV Base 3.50 cm      RV Length 8.40 cm      RV Mid 3.00 cm      TAPSE (>1.6) 1.90 cm2     Narrative:       · Cardiac chamber sizes and wall thicknesses are normal.  · Global and segmental LV systolic function is normal. The estimated left   ventricular ejection fraction is 70%.  · The aortic and mitral valves are normal in terms of both structure and   function.  · There is trace to mild tricuspid regurgitation with a normal estimated   RV systolic pressure.  · There is a \"trivial\" pericardial effusion.       ECG 12 Lead [[id number]] Collected:  12/16/19 0452     Updated:  12/16/19 0809    Narrative:       Test Reason : Pericarditis  Blood Pressure : **/** mmHG  Vent. Rate : 052 BPM     Atrial Rate : 052 BPM     P-R Int : 136 ms          QRS Dur : 100 ms      QT Int : 424 ms       P-R-T Axes : 061 065 073 degrees     QTc Int : 394 ms    Sinus bradycardia  Possible Acute pericarditis  Abnormal ECG  When compared with ECG of 15-DEC-2019 13:49,  No significant change was found  Confirmed by Adrien Day ([id number]) on 12/16/2019 8:09:47 AM    Referred By:             Confirmed By:Adrien Day    ECG 12 Lead [[id number]] Collected:  12/16/19 0601     Updated:  12/16/19 0810    Narrative:       Test Reason : Chest Pain  Blood Pressure : **/** mmHG  Vent. Rate : 046 BPM     Atrial Rate : 046 BPM     P-R Int : 094 ms          QRS Dur : 086 ms      QT Int : 442 ms       P-R-T Axes : 010 076 076 degrees     QTc Int : 386 ms    Sinus bradycardia with short AR  ST elevation, consider inferolateral injury or acute infarct  ** ** ACUTE MI / STEMI ** **  Abnormal ECG  When " compared with ECG of 16-DEC-2019 04:52, (Unconfirmed)  ST more elevated in Inferior leads  Confirmed by Adrien Day ([id number]) on 2019 8:10:15 AM    Referred By:             Confirmed By:Adrien Day             Physician Progress Notes (all)      Esthela Kat MD at 19 1255              [facility] Medicine Services  PROGRESS NOTE    Patient Name: Vamshi August  : 2000  MRN: [id number]    Date of Admission: 12/15/2019  Primary Care Physician: Shayy Aguilar APRN    Subjective   Subjective     CC:  Chest pain    HPI:  Patient with a episode of chest pain in the center of his chest this morning with pressure.  Improved with IV Toradol.  Denies any shortness of breath or lower extremity edema.  Rapid response was called.    Review of Systems  General: denies fevers or chills  CV: Positive for chest pain.  Resp: denies shortness of breath  Abd: denies abd pain, nausea        Objective   Objective     Vital Signs:   Temp:  [98.4 °F (36.9 °C)-98.9 °F (37.2 °C)] 98.7 °F (37.1 °C)  Heart Rate:  [42-56] 56  Resp:  [18] 18  BP: ()/(43-72) 99/56        Physical Exam:  Constitutional: No acute distress, sleeping well, easily awoken  HENT: NCAT, mucous membranes moist  Respiratory: Clear to auscultation bilaterally, respiratory effort normal on room air  Cardiovascular: Sinus bradycardia, no murmurs, rubs, or gallops, palpable pedal pulses bilaterally  Gastrointestinal: Positive bowel sounds, soft, nontender, nondistended  Musculoskeletal: No bilateral ankle edema  Psychiatric: Appropriate affect, cooperative  Neurologic: Oriented x 3, strength symmetric in all extremities, Cranial Nerves grossly intact to confrontation, speech clear  Skin: No rashes      Results Reviewed:    Results from last 7 days   Lab Units 19  0500 12/15/19  1158   WBC 10*3/mm3 7.57 8.16   HEMOGLOBIN g/dL 12.1* 13.8   HEMATOCRIT % 36.9* 42.5   PLATELETS 10*3/mm3 236 197      Results from last 7 days   Lab Units 12/16/19  1028 12/16/19  0500 12/15/19  1354 12/15/19  1158   SODIUM mmol/L  --  139  --  141   POTASSIUM mmol/L  --  4.0  --  4.0   CHLORIDE mmol/L  --  107  --  104   CO2 mmol/L  --  23.0  --  24.0   BUN mg/dL  --  14  --  15   CREATININE mg/dL  --  0.83  --  1.07   GLUCOSE mg/dL  --  106*  --  120*   CALCIUM mg/dL  --  8.6  --  9.1   ALT (SGPT) U/L  --   --   --  25   AST (SGOT) U/L  --   --   --  48*   TROPONIN T ng/mL 1.100* 0.708* 0.398* 0.278*   PROBNP pg/mL  --   --   --  204.5     Estimated Creatinine Clearance: 161.6 mL/min (by C-G formula based on SCr of 0.83 mg/dL).    Microbiology Results Abnormal     Procedure Component Value - Date/Time    Beta Strep Culture, Throat - Swab, Throat [[id number]]  (Normal) Collected:  12/15/19 1210    Lab Status:  Preliminary result Specimen:  Swab from Throat Updated:  12/16/19 1225     Throat Culture, Beta Strep No Beta Hemolytic Streptococcus Isolated    Narrative:       Group A Strep incidence is low in adults. Positive culture for Beta hemolytic Streptococcus species can reflect colonization and not true infection. Please correlate clinically.    Rapid Strep A Screen - Swab, Throat [[id number]]  (Normal) Collected:  12/15/19 1210    Lab Status:  Final result Specimen:  Swab from Throat Updated:  12/15/19 1241     Strep A Ag Negative    Narrative:       Test performed by Direct Antigen Testing.          Imaging Results (Last 24 Hours)     Procedure Component Value Units Date/Time    XR Chest 1 View [[id number]] Collected:  12/15/19 1246     Updated:  12/15/19 3911    Narrative:          EXAMINATION: XR CHEST 1 VW-      INDICATION: Chest Pain triage protocol      COMPARISON: NONE     FINDINGS: There is a normal cardiac silhouette. There is no pulmonary  inflammatory process, mass or effusion.           Impression:       No active disease     This report was finalized on 12/15/2019 5:38 PM by Dr. Henok Rajan MD.        "      Results for orders placed during the hospital encounter of 12/15/19   Adult Transthoracic Echo Complete W/ Cont if Necessary Per Protocol    Narrative · Cardiac chamber sizes and wall thicknesses are normal.  · Global and segmental LV systolic function is normal. The estimated left   ventricular ejection fraction is 70%.  · The aortic and mitral valves are normal in terms of both structure and   function.  · There is trace to mild tricuspid regurgitation with a normal estimated   RV systolic pressure.  · There is a \"trivial\" pericardial effusion.          I have reviewed the medications:  Scheduled Meds:  colchicine 0.6 mg Oral Q12H   ketorolac 30 mg Intravenous BID   Morphine 4 mg Intravenous Once   sodium chloride 10 mL Intravenous Q12H     Continuous Infusions:   PRN Meds:.sodium chloride  •  sodium chloride      Assessment/Plan   Assessment / Plan     Active Hospital Problems    Diagnosis  POA   • **Acute pericarditis [I30.9]  Yes      Resolved Hospital Problems   No resolved problems to display.        Brief Hospital Course to date:  Vamshi August is a 19 y.o. male with no significant past medical history who presents with chest pain.    Myocarditis  ST elevation  elevated troponins  -Troponins elevated and trending up, inflammatory markers are positive.  Continues to have chest pain but improved with IV Toradol.  EKG with generalized ST elevation.  -Echo with normal LV function and no pericardial fluid.  -Cardiology following.  Cardiac MRI today.  Continue NSAIDs, opioids as needed, colchicine.  -ID consulted.    Chlamydia  -Patient reports that he was treated about a week ago with azithromycin but PCR remains positive  -Received a dose of Rocephin in the ED.    -As a throat per ID    DVT Prophylaxis:      Disposition: I expect the patient to be discharged to be determined    CODE STATUS:   Code Status and Medical Interventions:   Ordered at: 12/15/19 3119     Level Of Support Discussed " "With:    Patient     Code Status:    CPR     Medical Interventions (Level of Support Prior to Arrest):    Full         Electronically signed by Esthela Kat MD, 12/16/19, 12:55 PM.        Electronically signed by Esthela Kat MD at 12/16/19 1313     Fredi Cervantes MD at 12/16/19 9161          CARDIOLOGY PROGRESS NOTE           12/16/2019 7:51 AM    Admit Date: 12/15/2019    Admit Diagnosis: Acute pericarditis, unspecified type [I30.9]    Chief Complaint: Follow up for Pericarditis    Subjective:     Nurses report \"rapid response\" for chest pain this morning. Received MS. Bush now with no SOA.          Objective:     Vitals:    12/16/19 0616 12/16/19 0618 12/16/19 0621 12/16/19 0624   BP: 106/60      BP Location:       Patient Position:       Pulse:  (!) 42 56 (!) 42   Resp:       Temp:       TempSrc:       SpO2:  99% 100% 99%   Weight:       Height:           Physical Exam:    Eyes - PERRLA  Neck- supple, No mass  CV- regular rate and rhythm, bradycardia. No rub or gallop. No murmur.  Lung- clear bilaterally  Abd- soft, +BS  Musc/skel - Norm strength and range of motion  Skin- warm and dry  Neuro - Alert & Oriented x 3, appropriate mood.        Current Facility-Administered Medications:   •  colchicine tablet 0.6 mg, 0.6 mg, Oral, Q12H, Jona Chase, APRN, 0.6 mg at 12/15/19 2212  •  Influenza Vac Subunit Quad (FLUCELVAX) injection 0.5 mL, 0.5 mL, Intramuscular, Once, Soila Shi MD  •  ketorolac (TORADOL) injection 30 mg, 30 mg, Intravenous, BID, Fredi Cervantes MD, 30 mg at 12/15/19 2212  •  Morphine sulfate (PF) injection 4 mg, 4 mg, Intravenous, Once, Riccardo Ramos MD, Stopped at 12/15/19 1322  •  sodium chloride 0.9 % flush 10 mL, 10 mL, Intravenous, PRN, Riccardo Ramos MD  •  sodium chloride 0.9 % flush 10 mL, 10 mL, Intravenous, Q12H, Jona Chase, APRN, 10 mL at 12/15/19 6879  •  sodium chloride 0.9 % flush 10 mL, 10 mL, Intravenous, PRN, Jona Chase, " "APRN        Echocardiogram 12/15/19  · Cardiac chamber sizes and wall thicknesses are normal.  · Global and segmental LV systolic function is normal. The estimated left ventricular ejection fraction is 70%.  · The aortic and mitral valves are normal in terms of both structure and function.  · There is trace to mild tricuspid regurgitation with a normal estimated RV systolic pressure.  There is a \"trivial\" pericardial effusion.    EKG this AM with CRISTY, really unchanged since 12/15/19.    Assessment:     At this time, the best diagnosis is MYOCARDITIS with chest pain, fairly generalized CRISTY, low grade troponin release (it has increased a bit since 12/15), positive inflammatory markers, arrhythmia (IDB) in ER, NORMAL echo LV function and no pericardial fluid. My plan:    - pain relief with NSAIDs, opoids as needed; continue colchicine for now.  - CMR today.  - I spoke with ANGELA Duque MD who has agreed to see for serologic evaluation.  - Will mine ER records for ? NSVT from yesterday.    Plan:       Electronically signed by RILEY Land, 12/16/19, 7:59 AM.      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  24-hour history of severe chest pain.  EKG in the emergency department with diffuse ST segment elevation consistent with pericardial inflammation.  Normal sed rate at 17 but C-reactive protein elevated to 12.  Serial troponin levels starting at 0.278 and peaking at 0.708.  Single episode of narrow complex tachycardia.  Recent episode of pharyngitis treated by a colleague with 3 days " of oral amoxicillin.  Rapid group A streptococcal antigen negative and throat culture is negative for beta-hemolytic streptococci in culture, at less than 24 hours of incubation.  Peripheral leukocyte count 7.6.  Plasma creatinine 0.83.  Transthoracic echocardiography with trace pericardial effusion and trivial tricuspid regurgitation.  Preserved left ventricular ejection fraction without regional wall motion abnormalities; however, no quantitation of ejection fraction.  Unremarkable for valvular heart disease or regurgitant lesion.  No endocardial vegetations.  Currently on ceftriaxone and colchicine added to regimen for anti-inflammatory effect.  Asked to assist with antimicrobial therapy and diagnostic evaluation in this context.    Past Medical History:   Diagnosis Date   • Known health problems: none        Past Surgical History:   Procedure Laterality Date   • DENTAL PROCEDURE     • WISDOM TOOTH EXTRACTION         Pediatric History   Patient Guardian Status   • Mother:  August,Lucie     Other Topics Concern   • Not on file   Social History Narrative    Current college student, lives in house with 4 roommates.        family history includes No Known Problems in his brother, father, mother, and sister.    No Known Allergies    Medication: MAR reviewed.  Antibiotics: See below.  Anti-Infectives (From admission, onward)    Ordered     Dose/Rate Route Frequency Start Stop    12/15/19 1250  cefTRIAXone (ROCEPHIN) 1 g/100 mL 0.9% NS (MBP)     Ordering Provider:  Giuliana Garcia APRN    1 g  over 30 Minutes Intravenous Once 12/15/19 1252 12/15/19 1404          Please refer to the medical record for a full medication list    Review of Systems  Pertinent items are noted in HPI, all other systems reviewed and negative    Objective     Physical Exam: See below.  Vital Signs   Temp:  [98.4 °F (36.9 °C)-98.9 °F (37.2 °C)] 98.7 °F (37.1 °C)  Heart Rate:  [42-56] 56  Resp:  [18] 18  BP: ()/(43-72) 99/56    Blood  "pressure 99/56, pulse 56, temperature 98.7 °F (37.1 °C), temperature source Oral, resp. rate 18, height 182.9 cm (72\"), weight 80.5 kg (177 lb 6.4 oz), SpO2 95 %.  GENERAL: Awake and alert, in no acute distress.  Does not appear to be clinically toxic.  HEENT: Oropharynx without thrush. Sinuses nontender. Dentition in good repair. No cervical adenopathy. No carotid bruits/ jugular venous distention.   EYES: PERRL. No conjunctival injection. No icterus. EOMI.  LYMPHATICS: No lymphadenopathy of the neck or axillary or inguinal regions.   HEART: No murmur, gallop, or pericardial friction rub.   LUNGS: Clear to auscultation anteriorly. No percussion dullness.   ABDOMEN: Soft, nontender, nondistended. No appreciable HSM.    SKIN: Warm and dry without cutaneous eruptions. No embolic stigmata.  No skin lesions of erythema marginatum.  No rheumatoid nodules.  PSYCHIATRIC: Mental status lucid. Cranial nerve function intact.  No basal ganglia involuntary movements to suggest choreoathetosis.      Results Review:   I reviewed the patient's new clinical results.  I reviewed the patient's new imaging results and agree with the interpretation.    Lab Results   Component Value Date    WBC 7.57 12/16/2019    HGB 12.1 (L) 12/16/2019    HCT 36.9 (L) 12/16/2019    MCV 94.1 12/16/2019     12/16/2019       Lab Results   Component Value Date    GLUCOSE 106 (H) 12/16/2019    BUN 14 12/16/2019    CREATININE 0.83 12/16/2019    EGFRIFNONA 118 12/16/2019    BCR 16.9 12/16/2019    CO2 23.0 12/16/2019    CALCIUM 8.6 12/16/2019    ALBUMIN 3.60 12/15/2019    AST 48 (H) 12/15/2019    ALT 25 12/15/2019       Estimated Creatinine Clearance: 161.6 mL/min (by C-G formula based on SCr of 0.83 mg/dL).      Microbiology: Blood cultures x2-.  Rapid group A streptococcal pharyngeal antigen negative.  Throat culture on blood auger negative for beta-hemolytic colonies.      Radiology:  Imaging Results (Last 72 Hours)     Procedure Component Value " Units Date/Time    XR Chest 1 View [[id number]] Collected:  12/15/19 1246     Updated:  12/15/19 1742    Narrative:          EXAMINATION: XR CHEST 1 VW-      INDICATION: Chest Pain triage protocol      COMPARISON: NONE     FINDINGS: There is a normal cardiac silhouette. There is no pulmonary  inflammatory process, mass or effusion.           Impression:       No active disease     This report was finalized on 12/15/2019 5:38 PM by Dr. Henok Rajan MD.             ASSESSMENT AND PLAN: Acute myopericarditis.  Diffuse ST segment elevation with marked increase in both troponin levels and CPK-MB.  Increased C-reactive protein to 12.  Recent pharyngitis/upper respiratory infection.  Broad differential diagnosis of myopericarditis in an otherwise healthy 19-year-old college student.  Coxsackie and echo viruses predominate.  Oftentimes, patients will present with a prodromal upper respiratory or gastrointestinal syndrome before the onset of chest pain.  This occasionally may be caused by influenza as the primary etiology and we should submit PCR studies to exclude this diagnosis.  Herpes group viruses including Gilmar-Barr virus, cytomegalovirus, and varicella-zoster may also present with myocardial involvement.  HIV seroconversion is also reported.  The patient is unaware of removing an embedded tick from his skin and has had no lesions that would suggest erythema chronicum migrans, making Lyme myocarditis unlikely.  Acute toxoplasmosis can cause myocarditis and is usually associated with ingesting inadequately cooked beef or with extensive exposure to cat litter.  Trypanosomiasis can cause a dilated cardiomyopathy but this is usually a late manifestation and often accompanied by esophageal dysmotility and achalasia.  There is no history of travel to [place].  Collagen vascular etiologies including rheumatoid arthritis, systemic lupus, and Wegener's granulomatosis are considered.  We will submit antinuclear  antibodies, rheumatoid factor, and ANCA profile.  Infiltrative disease including primary cardiac lymphoma, primary or secondary amyloidosis, or sarcoid are considered.  Cardiac MRI has been reported which will be helpful in excluding these etiologies.  The patient's recent pharyngitis certainly raises concerns for acute rheumatic fever.  At the present time, the only major Cervantes' criteria present is acute myocarditis.  There is been no history of migratory polyarthritis, cutaneous nodules, erythema marginatum, or choreoathetosis.  The patient is currently on colchicine for anti-inflammatory effect.  Given the patient's preserved left ventricular function and absence of mitral regurgitation on transthoracic echocardiography, I would not give corticosteroids at this time.  The literature is widely divergent with regards to immunosuppressive therapy, but the Declan series from a number of years ago suggested that early corticosteroids may be detrimental and actually enhance the need for cardiac transplantation.  If the patient's ejection fraction declines or he develops signs and symptoms of congestive heart failure, corticosteroids and intravenous immunoglobulin may be warranted.  In this unlikely event, he would require [facility] a [facility] with transplant capabilities.       I discussed the patients findings and my recommendations with patient, family and consulting provider    Thank you for this consult.  Our group would be pleased to follow this patient over the course of their hospitalization and assist with outpatient antimicrobial therapy, as indicated.     Reyes Duque MD  2019            Electronically signed by Reyes Duque MD at [phone]     Fredi Cervantes MD at 12/15/19 1611          [facility]  CARDIOLOGY CONSULTATION NOTE    Vamshi Strattonhip  : 2000  MRN:[id number]    Date of Admission:12/15/2019  Date of  "Consultation: 12/15/19    PCP: Shayy Aguilar APRN    IDENTIFICATION: A 19 y.o. male resident of [place], [place]     Chief Complaint   Patient presents with   • Chest pain    • Palpitations     PROBLEM LIST:   1. Pericarditis  A. Presentation to [facility] ED with acute onset chest pain 12/15/2019  B. EKG consistent with pericarditis, elevated ESR and C-RP              C. Single non-sustained narrow QRS rhythm of uncertain type  2. Sore throat              A. 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  He also works \"part-time\" in retail pharmacy.  This young man's growth and development has been unremarkable to this time.  He has had no major illnesses or surgeries.  He has no history of rheumatic fever, heart murmur, or other cardiac \"issues\".  He is active and goes to the gym 2-3 times a week (mainly for weight lifting).  There is no history of illicit drug use.  He uses occasional alcohol and does not smoke.  He is on no regular home medications.  He has been on amoxicillin that was written by a colleague 3 days ago to treat pharyngitis.  Cultures were not obtained.  Shortly after arising this morning he felt \"unwell\".  He developed chest pain, palpitations, and lightheadedness.  He did not have true presyncope or syncope.  On arrival in the ER he had somewhat generalized ST segment elevation and a slightly elevated troponin T.  Since being here he was given a single dose of Toradol and is now improved.  Also in the early ER phase he had a brief episode of rapid irregular narrow QRS tachycardia that is difficult for me to assess due to computer readout issues.  This is not recurred.      ROS: All systems have been reviewed and are negative with the exception of those mentioned in the HPI and problem list above.    Surgical History:   Past Surgical History:   Procedure " "Laterality Date   • DENTAL PROCEDURE     • WISDOM TOOTH EXTRACTION         Social History:   Social History     Socioeconomic History   • Marital status: Single   Tobacco Use   • Smoking status: Former Smoker     Types: Electronic Cigarette   • Smokeless tobacco: Never Used   Substance and Sexual Activity   • Alcohol use: Yes     Frequency: 2-4 times a month     Drinks per session: 3 or 4     Binge frequency: Never     Comment: 1-2 times a month   • Drug use: No   • Sexual activity: Not Currently     Partners: Female       Family History:   Family History   Problem Relation Age of Onset   • No Known Problems Mother    • No Known Problems Father    • No Known Problems Sister    • No Known Problems Brother        Objective     BP 94/47   Pulse 51   Temp 97.7 °F (36.5 °C) (Oral)   Resp 18   Ht 182.9 cm (72\")   Wt 79.4 kg (175 lb)   SpO2 99%   BMI 23.73 kg/m²      Intake/Output Summary (Last 24 hours) at 12/15/2019 1611  Last data filed at 12/15/2019 1239  Gross per 24 hour   Intake 1000 ml   Output --   Net 1000 ml       PHYSICAL EXAM:  CONSTITUTIONAL: Well nourished, cooperative, in no acute distress  HEENT: Normocephalic, atraumatic, PERRLA, no JVD, no carotid bruit  CARDIOVASCULAR:  Regular rhythm and normal rate, no murmur, gallop, rub. Peripheral pulses are present and equal bilaterally  RESPIRATORY: Clear to auscultation, normal respiratory effort, no wheezing, rales or rhonchi  GI: Soft, nontender, normal bowel sounds  MUSCULOSKELETAL: No gross deformities, no edema  SKIN: Warm, dry. No bleeding, bruising or rash  NEUROLOGICAL: No focal deficits  PSYCHIATRIC: Normal mood and affect. Behavior is normal     Labs/Diagnostic Data  Results from last 7 days   Lab Units 12/15/19  1158   SODIUM mmol/L 141   POTASSIUM mmol/L 4.0   CHLORIDE mmol/L 104   CO2 mmol/L 24.0   BUN mg/dL 15   CREATININE mg/dL 1.07   GLUCOSE mg/dL 120*   CALCIUM mg/dL 9.1     Results from last 7 days   Lab Units 12/15/19  1354 " 12/15/19  1158   CK TOTAL U/L  --  139   TROPONIN T ng/mL 0.398* 0.278*     Results from last 7 days   Lab Units 12/15/19  1158   WBC 10*3/mm3 8.16   HEMOGLOBIN g/dL 13.8   HEMATOCRIT % 42.5   PLATELETS 10*3/mm3 197       Results from last 7 days   Lab Units 12/15/19  1158   PROBNP pg/mL 204.5       I personally reviewed the patient's EKG/Telemetry data    Radiology Data: CXR and ECHO reported separately.    Current Medications:      Morphine 4 mg Intravenous Once          Assessment and Plan:     At this time, the most likely diagnosis is pericarditis versus epicarditis.  He had a mildly depressed blood pressure on admission and this has improved with fluids; however, this is not been a cardiac issue because of normal LV function in the absence of any significant pericardial fluid on echo.  The patient has been admitted.  I would recommend Toradol for the next 24 to 48 hours and then a conversion to oral nonsteroidal agents.  I will institute colchicine at a dose of 0.6 mg p.o. twice daily.  We will follow troponins and EKGs.  Final disposition will depend on the patient's initial clinical course.        Scribed for Fredi Cervantes MD by Esther Cade PA-C. 12/15/2019  4:11 PM      Thank you for allowing me to participate in the care of Vamshi August. Feel free to contact me directly with any further questions or concerns.      Electronically signed by Fredi Cervantes MD at 12/15/19 4148

## 2019-12-16 NOTE — CONSULTS
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  24-hour history of severe chest pain.  EKG in the emergency department with diffuse ST segment elevation consistent with pericardial inflammation.  Normal sed rate at 17 but C-reactive protein elevated to 12.  Serial troponin levels starting at 0.278 and peaking at 0.708.  Single episode of narrow complex tachycardia.  Recent episode of pharyngitis treated by a colleague with 3 days of oral amoxicillin.  Rapid group A streptococcal antigen negative and throat culture is negative for beta-hemolytic streptococci in culture, at less than 24 hours of incubation.  Peripheral leukocyte count 7.6.  Plasma creatinine 0.83.  Transthoracic echocardiography with trace pericardial effusion and trivial tricuspid regurgitation.  Preserved left ventricular ejection fraction without regional wall motion abnormalities; however, no quantitation of ejection fraction.  Unremarkable for valvular heart disease or regurgitant lesion.  No endocardial vegetations.  Currently on ceftriaxone and colchicine added to regimen for anti-inflammatory effect.  Asked to assist with antimicrobial therapy and diagnostic evaluation in this context.    Past Medical History:   Diagnosis Date   • Known health problems: none        Past Surgical History:   Procedure Laterality Date   • DENTAL PROCEDURE     • WISDOM TOOTH EXTRACTION         Pediatric History   Patient Guardian Status   • Mother:  Lucie August     Other Topics Concern   • Not on file   Social History Narrative    Current college student, lives in house with 4 roommates.        family history includes No Known Problems in his brother, father, mother, and  "sister.    No Known Allergies    Medication: MAR reviewed.  Antibiotics: See below.  Anti-Infectives (From admission, onward)    Ordered     Dose/Rate Route Frequency Start Stop    12/15/19 1250  cefTRIAXone (ROCEPHIN) 1 g/100 mL 0.9% NS (MBP)     Ordering Provider:  Giuliana Garcia APRN    1 g  over 30 Minutes Intravenous Once 12/15/19 1252 12/15/19 1404          Please refer to the medical record for a full medication list    Review of Systems  Pertinent items are noted in HPI, all other systems reviewed and negative    Objective     Physical Exam: See below.  Vital Signs   Temp:  [98.4 °F (36.9 °C)-98.9 °F (37.2 °C)] 98.7 °F (37.1 °C)  Heart Rate:  [42-56] 56  Resp:  [18] 18  BP: ()/(43-72) 99/56    Blood pressure 99/56, pulse 56, temperature 98.7 °F (37.1 °C), temperature source Oral, resp. rate 18, height 182.9 cm (72\"), weight 80.5 kg (177 lb 6.4 oz), SpO2 95 %.  GENERAL: Awake and alert, in no acute distress.  Does not appear to be clinically toxic.  HEENT: Oropharynx without thrush. Sinuses nontender. Dentition in good repair. No cervical adenopathy. No carotid bruits/ jugular venous distention.   EYES: PERRL. No conjunctival injection. No icterus. EOMI.  LYMPHATICS: No lymphadenopathy of the neck or axillary or inguinal regions.   HEART: No murmur, gallop, or pericardial friction rub.   LUNGS: Clear to auscultation anteriorly. No percussion dullness.   ABDOMEN: Soft, nontender, nondistended. No appreciable HSM.    SKIN: Warm and dry without cutaneous eruptions. No embolic stigmata.  No skin lesions of erythema marginatum.  No rheumatoid nodules.  PSYCHIATRIC: Mental status lucid. Cranial nerve function intact.  No basal ganglia involuntary movements to suggest choreoathetosis.      Results Review:   I reviewed the patient's new clinical results.  I reviewed the patient's new imaging results and agree with the interpretation.    Lab Results   Component Value Date    WBC 7.57 12/16/2019    HGB 12.1 " (L) 12/16/2019    HCT 36.9 (L) 12/16/2019    MCV 94.1 12/16/2019     12/16/2019       Lab Results   Component Value Date    GLUCOSE 106 (H) 12/16/2019    BUN 14 12/16/2019    CREATININE 0.83 12/16/2019    EGFRIFNONA 118 12/16/2019    BCR 16.9 12/16/2019    CO2 23.0 12/16/2019    CALCIUM 8.6 12/16/2019    ALBUMIN 3.60 12/15/2019    AST 48 (H) 12/15/2019    ALT 25 12/15/2019       Estimated Creatinine Clearance: 161.6 mL/min (by C-G formula based on SCr of 0.83 mg/dL).      Microbiology: Blood cultures x2-.  Rapid group A streptococcal pharyngeal antigen negative.  Throat culture on blood auger negative for beta-hemolytic colonies.      Radiology:  Imaging Results (Last 72 Hours)     Procedure Component Value Units Date/Time    XR Chest 1 View [[id number]] Collected:  12/15/19 1246     Updated:  12/15/19 1742    Narrative:          EXAMINATION: XR CHEST 1 VW-      INDICATION: Chest Pain triage protocol      COMPARISON: NONE     FINDINGS: There is a normal cardiac silhouette. There is no pulmonary  inflammatory process, mass or effusion.           Impression:       No active disease     This report was finalized on 12/15/2019 5:38 PM by Dr. Henok Rajan MD.             ASSESSMENT AND PLAN: Acute myopericarditis.  Diffuse ST segment elevation with marked increase in both troponin levels and CPK-MB.  Increased C-reactive protein to 12.  Recent pharyngitis/upper respiratory infection.  Broad differential diagnosis of myopericarditis in an otherwise healthy 19-year-old college student.  Coxsackie and echo viruses predominate.  Oftentimes, patients will present with a prodromal upper respiratory or gastrointestinal syndrome before the onset of chest pain.  This occasionally may be caused by influenza as the primary etiology and we should submit PCR studies to exclude this diagnosis.  Herpes group viruses including Gilmar-Barr virus, cytomegalovirus, and varicella-zoster may also present with myocardial involvement.   HIV seroconversion is also reported.  The patient is unaware of removing an embedded tick from his skin and has had no lesions that would suggest erythema chronicum migrans, making Lyme myocarditis unlikely.  Acute toxoplasmosis can cause myocarditis and is usually associated with ingesting inadequately cooked beef or with extensive exposure to cat litter.  Trypanosomiasis can cause a dilated cardiomyopathy but this is usually a late manifestation and often accompanied by esophageal dysmotility and achalasia.  There is no history of travel to [place].  Collagen vascular etiologies including rheumatoid arthritis, systemic lupus, and Wegener's granulomatosis are considered.  We will submit antinuclear antibodies, rheumatoid factor, and ANCA profile.  Infiltrative disease including primary cardiac lymphoma, primary or secondary amyloidosis, or sarcoid are considered.  Cardiac MRI has been reported which will be helpful in excluding these etiologies.  The patient's recent pharyngitis certainly raises concerns for acute rheumatic fever.  At the present time, the only major Cervantes' criteria present is acute myocarditis.  There is been no history of migratory polyarthritis, cutaneous nodules, erythema marginatum, or choreoathetosis.  The patient is currently on colchicine for anti-inflammatory effect.  Given the patient's preserved left ventricular function and absence of mitral regurgitation on transthoracic echocardiography, I would not give corticosteroids at this time.  The literature is widely divergent with regards to immunosuppressive therapy, but the Declan series from a number of years ago suggested that early corticosteroids may be detrimental and actually enhance the need for cardiac transplantation.  If the patient's ejection fraction declines or he develops signs and symptoms of congestive heart failure, corticosteroids and intravenous immunoglobulin may be warranted.  In this unlikely event, he would  require [facility] a [facility] with [facility] capabilities.       I discussed the patients findings and my recommendations with patient, family and consulting provider    Thank you for this consult.  Our group would be pleased to follow this patient over the course of their hospitalization and assist with outpatient antimicrobial therapy, as indicated.     Reyes Duque MD  12/16/2019

## 2019-12-16 NOTE — PROGRESS NOTES
[facility] Medicine Services  PROGRESS NOTE    Patient Name: Vamshi August  : 2000  MRN: [id number]    Date of Admission: 12/15/2019  Primary Care Physician: Shayy Aguilar APRN    Subjective   Subjective     CC:  Chest pain    HPI:  Patient with a episode of chest pain in the center of his chest this morning with pressure.  Improved with IV Toradol.  Denies any shortness of breath or lower extremity edema.  Rapid response was called.    Review of Systems  General: denies fevers or chills  CV: Positive for chest pain.  Resp: denies shortness of breath  Abd: denies abd pain, nausea        Objective   Objective     Vital Signs:   Temp:  [98.4 °F (36.9 °C)-98.9 °F (37.2 °C)] 98.7 °F (37.1 °C)  Heart Rate:  [42-56] 56  Resp:  [18] 18  BP: ()/(43-72) 99/56        Physical Exam:  Constitutional: No acute distress, sleeping well, easily awoken  HENT: NCAT, mucous membranes moist  Respiratory: Clear to auscultation bilaterally, respiratory effort normal on room air  Cardiovascular: Sinus bradycardia, no murmurs, rubs, or gallops, palpable pedal pulses bilaterally  Gastrointestinal: Positive bowel sounds, soft, nontender, nondistended  Musculoskeletal: No bilateral ankle edema  Psychiatric: Appropriate affect, cooperative  Neurologic: Oriented x 3, strength symmetric in all extremities, Cranial Nerves grossly intact to confrontation, speech clear  Skin: No rashes      Results Reviewed:    Results from last 7 days   Lab Units 19  0500 12/15/19  1158   WBC 10*3/mm3 7.57 8.16   HEMOGLOBIN g/dL 12.1* 13.8   HEMATOCRIT % 36.9* 42.5   PLATELETS 10*3/mm3 236 197     Results from last 7 days   Lab Units 19  1028 19  0500 12/15/19  1354 12/15/19  1158   SODIUM mmol/L  --  139  --  141   POTASSIUM mmol/L  --  4.0  --  4.0   CHLORIDE mmol/L  --  107  --  104   CO2 mmol/L  --  23.0  --  24.0   BUN mg/dL  --  14  --  15   CREATININE mg/dL  --  0.83  --  1.07    GLUCOSE mg/dL  --  106*  --  120*   CALCIUM mg/dL  --  8.6  --  9.1   ALT (SGPT) U/L  --   --   --  25   AST (SGOT) U/L  --   --   --  48*   TROPONIN T ng/mL 1.100* 0.708* 0.398* 0.278*   PROBNP pg/mL  --   --   --  204.5     Estimated Creatinine Clearance: 161.6 mL/min (by C-G formula based on SCr of 0.83 mg/dL).    Microbiology Results Abnormal     Procedure Component Value - Date/Time    Beta Strep Culture, Throat - Swab, Throat [[id number]]  (Normal) Collected:  12/15/19 1210    Lab Status:  Preliminary result Specimen:  Swab from Throat Updated:  12/16/19 1225     Throat Culture, Beta Strep No Beta Hemolytic Streptococcus Isolated    Narrative:       Group A Strep incidence is low in adults. Positive culture for Beta hemolytic Streptococcus species can reflect colonization and not true infection. Please correlate clinically.    Rapid Strep A Screen - Swab, Throat [[id number]]  (Normal) Collected:  12/15/19 1210    Lab Status:  Final result Specimen:  Swab from Throat Updated:  12/15/19 1241     Strep A Ag Negative    Narrative:       Test performed by Direct Antigen Testing.          Imaging Results (Last 24 Hours)     Procedure Component Value Units Date/Time    XR Chest 1 View [[id number]] Collected:  12/15/19 1246     Updated:  12/15/19 1742    Narrative:          EXAMINATION: XR CHEST 1 VW-      INDICATION: Chest Pain triage protocol      COMPARISON: NONE     FINDINGS: There is a normal cardiac silhouette. There is no pulmonary  inflammatory process, mass or effusion.           Impression:       No active disease     This report was finalized on 12/15/2019 5:38 PM by Dr. Henok Rajan MD.             Results for orders placed during the hospital encounter of 12/15/19   Adult Transthoracic Echo Complete W/ Cont if Necessary Per Protocol    Narrative · Cardiac chamber sizes and wall thicknesses are normal.  · Global and segmental LV systolic function is normal. The estimated left   ventricular ejection fraction  "is 70%.  · The aortic and mitral valves are normal in terms of both structure and   function.  · There is trace to mild tricuspid regurgitation with a normal estimated   RV systolic pressure.  · There is a \"trivial\" pericardial effusion.          I have reviewed the medications:  Scheduled Meds:  colchicine 0.6 mg Oral Q12H   ketorolac 30 mg Intravenous BID   Morphine 4 mg Intravenous Once   sodium chloride 10 mL Intravenous Q12H     Continuous Infusions:   PRN Meds:.sodium chloride  •  sodium chloride      Assessment/Plan   Assessment / Plan     Active Hospital Problems    Diagnosis  POA   • **Acute pericarditis [I30.9]  Yes      Resolved Hospital Problems   No resolved problems to display.        Brief Hospital Course to date:  Vamshi August is a 19 y.o. male with no significant past medical history who presents with chest pain.    Myocarditis  ST elevation  elevated troponins  -Troponins elevated and trending up, inflammatory markers are positive.  Continues to have chest pain but improved with IV Toradol.  EKG with generalized ST elevation.  -Echo with normal LV function and no pericardial fluid.  -Cardiology following.  Cardiac MRI today.  Continue NSAIDs, opioids as needed, colchicine.  -ID consulted.    Chlamydia  -Patient reports that he was treated about a week ago with azithromycin but PCR remains positive  -Received a dose of Rocephin in the ED.    -As a throat per ID    DVT Prophylaxis:      Disposition: I expect the patient to be discharged to be determined    CODE STATUS:   Code Status and Medical Interventions:   Ordered at: 12/15/19 6912     Level Of Support Discussed With:    Patient     Code Status:    CPR     Medical Interventions (Level of Support Prior to Arrest):    Full         Electronically signed by Esthela Kat MD, 12/16/19, 12:55 PM.      "

## 2019-12-16 NOTE — SIGNIFICANT NOTE
Called this morning for RRT, patient c/o chest pressure w/ pain rating 7/10; describes as the same kind of pain he had yesterday. The pain is reproducible on palpation, it does not change with deep inhalation. EKG concerning for changes, Dr. Colvin notified. Order for 2 mg morphine IV now, SBP marginally low ~ 100. Pain improved with morphine. Patient is receiving toradol BID, next dose due @ 0900 as well as colchicine.

## 2019-12-17 PROBLEM — A74.9 CHLAMYDIA: Status: ACTIVE | Noted: 2019-12-17

## 2019-12-17 LAB
BACTERIA SPEC AEROBE CULT: NORMAL
CHROMATIN AB SERPL-ACNC: <10 IU/ML (ref 0–14)
EBV EA IGG SER-ACNC: <9 U/ML (ref 0–8.9)
EBV NA IGG SER IA-ACNC: 127 U/ML (ref 0–17.9)
EBV VCA IGG SER-ACNC: 175 U/ML (ref 0–17.9)
EBV VCA IGM SER-ACNC: <36 U/ML (ref 0–35.9)
INTERPRETATION: ABNORMAL
TROPONIN T SERPL-MCNC: 2.2 NG/ML (ref 0–0.03)

## 2019-12-17 PROCEDURE — 25010000002 KETOROLAC TROMETHAMINE PER 15 MG: Performed by: INTERNAL MEDICINE

## 2019-12-17 PROCEDURE — 84484 ASSAY OF TROPONIN QUANT: CPT | Performed by: INTERNAL MEDICINE

## 2019-12-17 PROCEDURE — 93005 ELECTROCARDIOGRAM TRACING: CPT | Performed by: NURSE PRACTITIONER

## 2019-12-17 PROCEDURE — 99232 SBSQ HOSP IP/OBS MODERATE 35: CPT | Performed by: INTERNAL MEDICINE

## 2019-12-17 PROCEDURE — 99233 SBSQ HOSP IP/OBS HIGH 50: CPT | Performed by: INTERNAL MEDICINE

## 2019-12-17 PROCEDURE — 93010 ELECTROCARDIOGRAM REPORT: CPT | Performed by: INTERNAL MEDICINE

## 2019-12-17 RX ADMIN — AZITHROMYCIN 500 MG: 250 TABLET, FILM COATED ORAL at 08:49

## 2019-12-17 RX ADMIN — KETOROLAC TROMETHAMINE 30 MG: 30 INJECTION, SOLUTION INTRAMUSCULAR; INTRAVENOUS at 21:55

## 2019-12-17 RX ADMIN — COLCHICINE 0.6 MG: 0.6 TABLET, FILM COATED ORAL at 03:26

## 2019-12-17 RX ADMIN — SODIUM CHLORIDE, PRESERVATIVE FREE 10 ML: 5 INJECTION INTRAVENOUS at 08:56

## 2019-12-17 RX ADMIN — SODIUM CHLORIDE, PRESERVATIVE FREE 10 ML: 5 INJECTION INTRAVENOUS at 21:15

## 2019-12-17 RX ADMIN — KETOROLAC TROMETHAMINE 30 MG: 30 INJECTION, SOLUTION INTRAMUSCULAR; INTRAVENOUS at 08:49

## 2019-12-17 RX ADMIN — COLCHICINE 0.6 MG: 0.6 TABLET, FILM COATED ORAL at 08:49

## 2019-12-17 RX ADMIN — HYDROCODONE BITARTRATE AND ACETAMINOPHEN 1 TABLET: 5; 325 TABLET ORAL at 09:47

## 2019-12-17 NOTE — PROGRESS NOTES
1       Vamshi August  2000  [id number]  12/17/2019    CC: Chest pain    Vamshi August is a 19 y.o. male here for antecedent pharyngitis and low-grade fever, myopericarditis with increased troponin levels and diffuse ST segment elevation.      Past medical history:  Past Medical History:   Diagnosis Date   • Known health problems: none        Medications:   Current Facility-Administered Medications:   •  azithromycin (ZITHROMAX) tablet 500 mg, 500 mg, Oral, Q24H, Esthela Kat MD, 500 mg at 12/16/19 1751  •  colchicine tablet 0.6 mg, 0.6 mg, Oral, Q12H, Jona Chase APRN, 0.6 mg at 12/17/19 0326  •  HYDROcodone-acetaminophen (NORCO) 5-325 MG per tablet 1 tablet, 1 tablet, Oral, Q6H PRN, Esthela Kat MD, 1 tablet at 12/16/19 1811  •  ketorolac (TORADOL) injection 30 mg, 30 mg, Intravenous, BID, Fredi Cervantes MD, 30 mg at 12/16/19 2300  •  Morphine sulfate (PF) injection 4 mg, 4 mg, Intravenous, Once, Riccardo Ramos MD, Stopped at 12/15/19 1322  •  sodium chloride 0.9 % flush 10 mL, 10 mL, Intravenous, PRN, Riccardo Ramos MD  •  sodium chloride 0.9 % flush 10 mL, 10 mL, Intravenous, Q12H, Jona Chase APRNINA, 10 mL at 12/16/19 0856  •  sodium chloride 0.9 % flush 10 mL, 10 mL, Intravenous, PRN, Jona Chase APRN  Antibiotics:  Anti-Infectives (From admission, onward)    Ordered     Dose/Rate Route Frequency Start Stop    12/16/19 1312  azithromycin (ZITHROMAX) tablet 500 mg  Review   Ordering Provider:  Esthela Kat MD    500 mg Oral Every 24 Hours Scheduled 12/16/19 1400 12/23/19 0859    12/15/19 1250  cefTRIAXone (ROCEPHIN) 1 g/100 mL 0.9% NS (MBP)     Ordering Provider:  Giuliana Garcia APRN    1 g  over 30 Minutes Intravenous Once 12/15/19 1252 12/15/19 1404          Allergies:  has No Known Allergies.    Review of Systems: All other reviewed and negative except as per HPI    Blood pressure 100/53, pulse 53, temperature 97.8 °F  "(36.6 °C), temperature source Oral, resp. rate 18, height 182.9 cm (72\"), weight 80.5 kg (177 lb 6.4 oz), SpO2 93 %.  GENERAL: Awake and alert, in no acute distress.  Sore throat is resolved.  Chest pain is improved with colchicine and Toradol.  HEENT: Oropharynx without thrush. Sinuses nontender. Dentition in good repair. No cervical adenopathy. No carotid bruits/ jugular venous distention.   EYES: PERRL. No conjunctival injection. No icterus. EOMI.  LYMPHATICS: No lymphadenopathy of the neck or axillary or inguinal regions.   HEART: No mitral regurgitation murmur, gallop, or pericardial friction rub.   LUNGS: Clear to auscultation anteriorly. No percussion dullness.   ABDOMEN: Soft, nontender, nondistended. No appreciable HSM.    SKIN: Warm and dry without cutaneous eruptions. No embolic stigmata.   PSYCHIATRIC: Mental status lucid. Cranial nerve function intact.       DIAGNOSTICS:  Lab Results   Component Value Date    WBC 7.57 12/16/2019    HGB 12.1 (L) 12/16/2019    HCT 36.9 (L) 12/16/2019     12/16/2019     Lab Results   Component Value Date    CRP 12.03 (H) 12/15/2019     Lab Results   Component Value Date    SEDRATE 17 (H) 12/15/2019     Lab Results   Component Value Date    GLUCOSE 106 (H) 12/16/2019    BUN 14 12/16/2019    CREATININE 0.83 12/16/2019    EGFRIFNONA 118 12/16/2019    BCR 16.9 12/16/2019    CO2 23.0 12/16/2019    CALCIUM 8.6 12/16/2019    ALBUMIN 3.60 12/15/2019    AST 48 (H) 12/15/2019    ALT 25 12/15/2019       Microbiology: Bio fire respiratory pathogens panel on nasopharyngeal swab negative.  Of note, influenza A/B in particular, is negative.  12/15 blood cultures x2 unremarkable.  Rapid group A streptococcal antigen negative.     RADIOLOGY:  Imaging Results (Last 72 Hours)     Procedure Component Value Units Date/Time    MRI Cardiac Function Complete With & Without Morphology [[id number]] Resulted:  12/16/19 1516     Updated:  12/16/19 1526    XR Chest 1 View [[id number]] Collected:  " 12/15/19 1246     Updated:  12/15/19 1742    Narrative:          EXAMINATION: XR CHEST 1 VW-      INDICATION: Chest Pain triage protocol      COMPARISON: NONE     FINDINGS: There is a normal cardiac silhouette. There is no pulmonary  inflammatory process, mass or effusion.           Impression:       No active disease     This report was finalized on 12/15/2019 5:38 PM by Dr. Henok Rajan MD.             Assessment and Plan: Antecedent pharyngitis.  Acute myopericarditis.  EKG with diffuse ST segment elevation.  Marked increase in troponin levels.  No regional wall motion abnormalities or significant mitral regurgitation by transthoracic echocardiogram.  Maximum temperature over 24 hours 98.8.  Currently 97.8.  Pulse 53, respiratory 18, blood pressure 100/53 mmHg.  O2 saturation 94% on room air.  Peripheral leukocyte count 7.6.  C-reactive protein 12.  Bio fire respiratory pathogens panel negative.  Serologic work-up outstanding.  Cardiac MRI performed yesterday/pending dictated report.  Utilization management: From my perspective, acceptable for discharge.  Would anticipate 7 to 10-day course of colchicine and completing an additional 6 days of oral azithromycin.  I would like to see the patient back in the office in 1 week to follow-up on outstanding studies.      Reyes Duque MD  12/17/2019

## 2019-12-17 NOTE — PROGRESS NOTES
Discharge Planning Assessment  [facility]     Patient Name: Vamshi August  MRN: [id number]  Today's Date: 12/17/2019    Admit Date: 12/15/2019    Discharge Needs Assessment     Row Name 12/17/19 1555       Living Environment    Lives With  friend(s)    Current Living Arrangements  home/apartment/condo    Primary Care Provided by  self    Provides Primary Care For  no one    Family Caregiver if Needed  parent(s)    Quality of Family Relationships  supportive;involved;helpful    Able to Return to Prior Arrangements  yes       Resource/Environmental Concerns    Transportation Concerns  car, none       Transition Planning    Patient/Family Anticipates Transition to  home    Patient/Family Anticipated Services at Transition      Transportation Anticipated  family or friend will provide       Discharge Needs Assessment    Readmission Within the Last 30 Days  no previous admission in last 30 days    Concerns to be Addressed  no discharge needs identified;denies needs/concerns at this time;discharge planning    Equipment Currently Used at Home  none    Anticipated Changes Related to Illness  none    Equipment Needed After Discharge  none        Discharge Plan     Row Name 12/17/19 1555       Plan    Plan  HOME    Patient/Family in Agreement with Plan  yes    Plan Comments  Met with pt at bedside.  He resides with friends in [place].  He is independent with ADLs.  No current DME or HH services.  Confirmed he has [employer] insurance with Rx coverage.  Goal is to return home upon DC.  No immediate needs identified/voiced.  CM will cont to follow.    Final Discharge Disposition Code  01 - home or self-care        Destination      Coordination has not been started for this encounter.      Durable Medical Equipment      Coordination has not been started for this encounter.      Dialysis/Infusion      Coordination has not been started for this encounter.      Home Medical Care      Coordination has  not been started for this encounter.      Therapy      Coordination has not been started for this encounter.      Community Resources      Coordination has not been started for this encounter.          Demographic Summary     Row Name 12/17/19 1554       General Information    Admission Type  inpatient    Referral Source  admission list    Reason for Consult  discharge planning    Preferred Language  English       Contact Information    Permission Granted to Share Info With      Contact Information Obtained for          Functional Status     Row Name 12/17/19 1554       Functional Status    Usual Activity Tolerance  good       Functional Status, IADL    Medications  independent    Meal Preparation  independent    Housekeeping  independent    Laundry  independent    Shopping  independent        Psychosocial    No documentation.       Abuse/Neglect    No documentation.       Legal    No documentation.       Substance Abuse    No documentation.       Patient Forms    No documentation.           Arelis Arce RN

## 2019-12-17 NOTE — PLAN OF CARE
Vss, patient slept well, patient has elevated troponin and abnormal Ekg but no current c/o pain. Will continue to monitor.

## 2019-12-17 NOTE — PROGRESS NOTES
[facility] Medicine Services  PROGRESS NOTE    Patient Name: Vamshi August  : 2000  MRN: [id number]    Date of Admission: 12/15/2019  Primary Care Physician: Shayy Aguilar APRN    Subjective   Subjective     CC:  Chest pain    HPI:  Continues to have some intermittent CP.  Severity of episode seem to be trending down.  No other complaints.  No fever, no SOB.    Review of Systems  General: denies fevers or chills  CV: Positive for chest pain.  Resp: denies shortness of breath  Abd: denies abd pain, nausea        Objective   Objective     Vital Signs:   Temp:  [97.8 °F (36.6 °C)-98.4 °F (36.9 °C)] 98.4 °F (36.9 °C)  Heart Rate:  [53-65] 62  Resp:  [18-22] 18  BP: (100-112)/(53-64) 102/59        Physical Exam:  Constitutional: No acute distress, well developed  HENT: NCAT, mucous membranes moist  Respiratory: Clear to auscultation bilaterally, respiratory effort normal on room air  Cardiovascular: RRR, no murmurs, rubs, or gallops   Gastrointestinal: Positive bowel sounds, soft, nontender, nondistended  Musculoskeletal: No bilateral ankle edema  Psychiatric: Appropriate affect, cooperative  Neurologic: Oriented x 3, no focal deficits  Skin: No rashes      Results Reviewed:    Results from last 7 days   Lab Units 19  0500 12/15/19  1158   WBC 10*3/mm3 7.57 8.16   HEMOGLOBIN g/dL 12.1* 13.8   HEMATOCRIT % 36.9* 42.5   PLATELETS 10*3/mm3 236 197     Results from last 7 days   Lab Units 19  0326 19  1028 19  0500  12/15/19  1158   SODIUM mmol/L  --   --  139  --  141   POTASSIUM mmol/L  --   --  4.0  --  4.0   CHLORIDE mmol/L  --   --  107  --  104   CO2 mmol/L  --   --  23.0  --  24.0   BUN mg/dL  --   --  14  --  15   CREATININE mg/dL  --   --  0.83  --  1.07   GLUCOSE mg/dL  --   --  106*  --  120*   CALCIUM mg/dL  --   --  8.6  --  9.1   ALT (SGPT) U/L  --   --   --   --  25   AST (SGOT) U/L  --   --   --   --  48*   TROPONIN T ng/mL  2.200* 1.100* 0.708*   < > 0.278*   PROBNP pg/mL  --   --   --   --  204.5    < > = values in this interval not displayed.     Estimated Creatinine Clearance: 161.6 mL/min (by C-G formula based on SCr of 0.83 mg/dL).    Microbiology Results Abnormal     Procedure Component Value - Date/Time    Blood Culture - Blood, Arm, Right [[id number]] Collected:  12/15/19 1300    Lab Status:  Preliminary result Specimen:  Blood from Arm, Right Updated:  12/17/19 1315     Blood Culture No growth at 2 days    Blood Culture - Blood, Arm, Right [[id number]] Collected:  12/15/19 1300    Lab Status:  Preliminary result Specimen:  Blood from Arm, Right Updated:  12/17/19 1315     Blood Culture No growth at 2 days    Beta Strep Culture, Throat - Swab, Throat [[id number]]  (Normal) Collected:  12/15/19 1210    Lab Status:  Final result Specimen:  Swab from Throat Updated:  12/17/19 1123     Throat Culture, Beta Strep No Beta Hemolytic Streptococcus Isolated    Narrative:       Group A Strep incidence is low in adults. Positive culture for Beta hemolytic Streptococcus species can reflect colonization and not true infection. Please correlate clinically.    Respiratory Panel, PCR - Swab, Nasopharynx [[id number]]  (Normal) Collected:  12/16/19 2000    Lab Status:  Final result Specimen:  Swab from Nasopharynx Updated:  12/16/19 2147     ADENOVIRUS, PCR Not Detected     Coronavirus 229E Not Detected     Coronavirus HKU1 Not Detected     Coronavirus NL63 Not Detected     Coronavirus OC43 Not Detected     Human Metapneumovirus Not Detected     Human Rhinovirus/Enterovirus Not Detected     Influenza B PCR Not Detected     Parainfluenza Virus 1 Not Detected     Parainfluenza Virus 2 Not Detected     Parainfluenza Virus 3 Not Detected     Parainfluenza Virus 4 Not Detected     Bordetella pertussis pcr Not Detected     Influenza A H1 2009 PCR Not Detected     Chlamydophila pneumoniae PCR Not Detected     Mycoplasma pneumo by PCR Not Detected      "Influenza A PCR Not Detected     Influenza A H3 Not Detected     Influenza A H1 Not Detected     RSV, PCR Not Detected     Bordetella parapertussis PCR Not Detected    Rapid Strep A Screen - Swab, Throat [[id number]]  (Normal) Collected:  12/15/19 1210    Lab Status:  Final result Specimen:  Swab from Throat Updated:  12/15/19 1241     Strep A Ag Negative    Narrative:       Test performed by Direct Antigen Testing.          Imaging Results (Last 24 Hours)     Procedure Component Value Units Date/Time    MRI Cardiac Function Complete With & Without Morphology [[id number]] Resulted:  12/16/19 1516     Updated:  12/17/19 1410          Results for orders placed during the hospital encounter of 12/15/19   Adult Transthoracic Echo Complete W/ Cont if Necessary Per Protocol    Narrative · Cardiac chamber sizes and wall thicknesses are normal.  · Global and segmental LV systolic function is normal. The estimated left   ventricular ejection fraction is 70%.  · The aortic and mitral valves are normal in terms of both structure and   function.  · There is trace to mild tricuspid regurgitation with a normal estimated   RV systolic pressure.  · There is a \"trivial\" pericardial effusion.          I have reviewed the medications:  Scheduled Meds:    azithromycin 500 mg Oral Q24H   ketorolac 30 mg Intravenous BID   Morphine 4 mg Intravenous Once   sodium chloride 10 mL Intravenous Q12H     Continuous Infusions:   PRN Meds:.HYDROcodone-acetaminophen  •  sodium chloride  •  sodium chloride      Assessment/Plan   Assessment / Plan     Active Hospital Problems    Diagnosis  POA   • **Myocarditis (CMS/formerly [facility]) [I51.4]  Yes     Priority: Medium   • Chlamydia (s/p treatment) [A74.9]  Yes   • Elevated troponin [R79.89]  Yes      Resolved Hospital Problems   No resolved problems to display.        Brief Hospital Course to date:  Vamshi August is a 19 y.o. male with no significant past medical history who presents with chest " pain.    Myocarditis  ST elevation  elevated troponins  -trop trending up still.  Dr. Cervantes wants it to trend down prior to discharge.  - per his note Cardiac MR showed EF 55% and some inferolateral hypokinesia and infiltrate c/w myocarditis  - personallt reviewee ekg today which shows persistent ST elevation  - serologic work-up is negative so barry  - continue NSAID for now.  Colchicine stopped per Dr. Cervantes.      Chlamydia  -Patient reports that he was treated about a week ago with azithromycin but PCR remains positive  -Received a dose of Rocephin in the ED.  - continue PO azitho per Dr. Duque      DVT Prophylaxis:  mechanical     Disposition: I expect the patient to be discharged home tomorrow if trop trends down.    CODE STATUS:   Code Status and Medical Interventions:   Ordered at: 12/15/19 2601     Level Of Support Discussed With:    Patient     Code Status:    CPR     Medical Interventions (Level of Support Prior to Arrest):    Full       Electronically signed by Nathen Guerrero MD, 12/17/19, 4:26 PM.

## 2019-12-17 NOTE — PLAN OF CARE
Problem: Patient Care Overview  Goal: Plan of Care Review  Outcome: Ongoing (interventions implemented as appropriate)  Flowsheets (Taken 12/17/2019 1512)  Progress: improving  Plan of Care Reviewed With: patient  Outcome Summary: VSS on RA. One episode of chest pain this morning, relieved by PRN norco. Will continue to monitor.

## 2019-12-17 NOTE — PROGRESS NOTES
"[facility]  PROGRESS NOTE      Date of Admission: 12/15/2019  Length of Stay: 1  Primary Care Physician: Shayy Aguilar APRN    Chief Complaint: Chest pain      Problem List:     1. Pericarditis  A. Presentation to [facility] ED with acute onset chest pain 12/15/2019  B. EKG consistent with pericarditis, elevated ESR and C-RP              C. Elevated inflammatory marker, troponin, Echo with nl LV, no pericardial rub or                  effusion.              D. Cardiac MRI c/w myocarditis  2. Sore throat              A. On Amoxicillin for 3 days (at admission).     Subjective      HPI:  His chest pain is improved over the last 24 hours.  He has not had symptoms suggesting heart failure.      Objective   Vital Signs:  Temp:  [97.8 °F (36.6 °C)-98 °F (36.7 °C)] 97.8 °F (36.6 °C)  Heart Rate:  [53-65] 53  Resp:  [18-22] 18  BP: (100-112)/(53-64) 100/53    Intake/Output Summary (Last 24 hours) at 12/17/2019 1300  Last data filed at 12/17/2019 0900  Gross per 24 hour   Intake 359 ml   Output --   Net 359 ml     Flowsheet Rows      First Filed Value   Admission Height  182.9 cm (72\") Documented at 12/15/2019 1137   Admission Weight  79.4 kg (175 lb) Documented at 12/15/2019 1137          Physical Exam:   HEENT: Fundoscopic deferred, otherwise unremarkable.  NECK: No Jugular venous distention, adenopathy, or thyromegaly noted.   HEART: No discrete PMI is noted. The 1st and 2nd heart sounds are normal, and no murmurs, gallops, rubs, clicks, or other sounds are noted.  LUNGS: Clear to auscultation.  ABDOMEN: Flat without evidence of organomegaly, masses, or tenderness.  NEUROLOGIC: No focal abnormalities involving strength or sensation are noted.   EXTREMITIES: No clubbing, cyanosis, or edema noted.         Results Review:   Results from last 7 days   Lab Units 12/16/19  0500 12/15/19  1158   SODIUM mmol/L 139 141   POTASSIUM mmol/L 4.0 4.0   CHLORIDE mmol/L 107 104   CO2 mmol/L 23.0 24.0 " "  BUN mg/dL 14 15   CREATININE mg/dL 0.83 1.07   GLUCOSE mg/dL 106* 120*   CALCIUM mg/dL 8.6 9.1     Results from last 7 days   Lab Units 12/17/19  0326 12/16/19  1028 12/16/19  0500 12/15/19  1354 12/15/19  1158   CK TOTAL U/L  --   --   --   --  139   TROPONIN T ng/mL 2.200* 1.100* 0.708* 0.398* 0.278*     Results from last 7 days   Lab Units 12/16/19  0500 12/15/19  1158   WBC 10*3/mm3 7.57 8.16   HEMOGLOBIN g/dL 12.1* 13.8   HEMATOCRIT % 36.9* 42.5   PLATELETS 10*3/mm3 236 197                               Current Medications:    azithromycin 500 mg Oral Q24H   colchicine 0.6 mg Oral Q12H   ketorolac 30 mg Intravenous BID   Morphine 4 mg Intravenous Once   sodium chloride 10 mL Intravenous Q12H            I reviewed the patient's new clinical results.  I personally viewed and interpreted the patient's EKG/Telemetry data    Assessment and Plan:     I spoke with Humphrey Peters this morning.  He has \"read\" the patient's MRI which shows an ejection fraction of about 55%, inferolateral wall hypokinesia, and an infiltrate consistent with myocarditis and that LV wall segment (initial verbal report).  There is no pericardial involvement.  There is no pericardial effusion.    At this time I am concerned that the patient's troponin has continued to rise albeit by small increments.  He continues to have some chest pain.  There are no indication for steroids at this point; however, he will not be ready for discharge to the troponin rise stops.  Continue nonsteroidal agents.  I will discontinue colchicine because I do not think there is any pericardial involvement.  I discussed these findings and recommendations in detail with the patient.      Fredi Cervantes MD  12/17/19  1:00 PM  "

## 2019-12-18 LAB
ACE SERPL-CCNC: 16 U/L (ref 14–82)
ANA SER QL: NEGATIVE
ANION GAP SERPL CALCULATED.3IONS-SCNC: 10 MMOL/L (ref 5–15)
BUN BLD-MCNC: 12 MG/DL (ref 6–20)
BUN/CREAT SERPL: 14.6 (ref 7–25)
CALCIUM SPEC-SCNC: 8.9 MG/DL (ref 8.6–10.5)
CHLORIDE SERPL-SCNC: 103 MMOL/L (ref 98–107)
CO2 SERPL-SCNC: 27 MMOL/L (ref 22–29)
CREAT BLD-MCNC: 0.82 MG/DL (ref 0.76–1.27)
GFR SERPL CREATININE-BSD FRML MDRD: 120 ML/MIN/1.73
GLUCOSE BLD-MCNC: 91 MG/DL (ref 65–99)
LABORATORY COMMENT REPORT: ABNORMAL
POTASSIUM BLD-SCNC: 4 MMOL/L (ref 3.5–5.2)
SODIUM BLD-SCNC: 140 MMOL/L (ref 136–145)
T GONDII IGG SERPL IA-ACNC: 7.7 IU/ML (ref 0–7.1)
T GONDII IGM SER IA-ACNC: 6.4 AU/ML (ref 0–7.9)
TROPONIN T SERPL-MCNC: 2.36 NG/ML (ref 0–0.03)

## 2019-12-18 PROCEDURE — 93010 ELECTROCARDIOGRAM REPORT: CPT | Performed by: INTERNAL MEDICINE

## 2019-12-18 PROCEDURE — 25010000002 KETOROLAC TROMETHAMINE PER 15 MG: Performed by: INTERNAL MEDICINE

## 2019-12-18 PROCEDURE — 84484 ASSAY OF TROPONIN QUANT: CPT | Performed by: INTERNAL MEDICINE

## 2019-12-18 PROCEDURE — 93005 ELECTROCARDIOGRAM TRACING: CPT | Performed by: INTERNAL MEDICINE

## 2019-12-18 PROCEDURE — 80048 BASIC METABOLIC PNL TOTAL CA: CPT | Performed by: INTERNAL MEDICINE

## 2019-12-18 PROCEDURE — 99232 SBSQ HOSP IP/OBS MODERATE 35: CPT | Performed by: INTERNAL MEDICINE

## 2019-12-18 RX ADMIN — AZITHROMYCIN 500 MG: 250 TABLET, FILM COATED ORAL at 09:57

## 2019-12-18 RX ADMIN — SODIUM CHLORIDE, PRESERVATIVE FREE 10 ML: 5 INJECTION INTRAVENOUS at 21:44

## 2019-12-18 RX ADMIN — KETOROLAC TROMETHAMINE 30 MG: 30 INJECTION, SOLUTION INTRAMUSCULAR; INTRAVENOUS at 21:43

## 2019-12-18 RX ADMIN — KETOROLAC TROMETHAMINE 30 MG: 30 INJECTION, SOLUTION INTRAMUSCULAR; INTRAVENOUS at 09:57

## 2019-12-18 NOTE — PROGRESS NOTES
[facility]  PROGRESS NOTE    Patient Name: Vamshi August  : 2000  MRN: [id number]    Date of Admission: 12/15/2019  Primary Care Physician: Shayy Aguilar APRN    Subjective   Subjective     CC:  Chest pain    HPI:  Denies CP for last 24 hours.  Friend at bedside.  No other issues.    Review of Systems  General: denies fevers or chills  CV: Positive for chest pain.  Resp: denies shortness of breath  Abd: denies abd pain, nausea        Objective   Objective     Vital Signs:   Temp:  [98 °F (36.7 °C)-98.8 °F (37.1 °C)] 98 °F (36.7 °C)  Heart Rate:  [50-68] 53  Resp:  [16-18] 16  BP: ()/(53-66) 98/56        Physical Exam:  Constitutional: No acute distress, well developed  HENT: NCAT, mucous membranes moist  Respiratory: Clear to auscultation bilaterally, respiratory effort normal on room air  Cardiovascular: RRR, no murmurs, rubs, or gallops   Gastrointestinal: Positive bowel sounds, soft, nontender, nondistended  Musculoskeletal: No bilateral ankle edema  Psychiatric: Appropriate affect, cooperative  Neurologic: Oriented x 3, no focal deficits  Skin: No rashes  Exam stable from     Results Reviewed:    Results from last 7 days   Lab Units 19  0500 12/15/19  1158   WBC 10*3/mm3 7.57 8.16   HEMOGLOBIN g/dL 12.1* 13.8   HEMATOCRIT % 36.9* 42.5   PLATELETS 10*3/mm3 236 197     Results from last 7 days   Lab Units 19  0557 19  0326 19  1028 19  0500  12/15/19  1158   SODIUM mmol/L 140  --   --  139  --  141   POTASSIUM mmol/L 4.0  --   --  4.0  --  4.0   CHLORIDE mmol/L 103  --   --  107  --  104   CO2 mmol/L 27.0  --   --  23.0  --  24.0   BUN mg/dL 12  --   --  14  --  15   CREATININE mg/dL 0.82  --   --  0.83  --  1.07   GLUCOSE mg/dL 91  --   --  106*  --  120*   CALCIUM mg/dL 8.9  --   --  8.6  --  9.1   ALT (SGPT) U/L  --   --   --   --   --  25   AST (SGOT) U/L  --   --   --   --   --  48*   TROPONIN T  ng/mL 2.360* 2.200* 1.100* 0.708*   < > 0.278*   PROBNP pg/mL  --   --   --   --   --  204.5    < > = values in this interval not displayed.     Estimated Creatinine Clearance: 163.6 mL/min (by C-G formula based on SCr of 0.82 mg/dL).    Microbiology Results Abnormal     Procedure Component Value - Date/Time    Blood Culture - Blood, Arm, Right [[id number]] Collected:  12/15/19 1300    Lab Status:  Preliminary result Specimen:  Blood from Arm, Right Updated:  12/18/19 1315     Blood Culture No growth at 3 days    Blood Culture - Blood, Arm, Right [[id number]] Collected:  12/15/19 1300    Lab Status:  Preliminary result Specimen:  Blood from Arm, Right Updated:  12/18/19 1315     Blood Culture No growth at 3 days    Beta Strep Culture, Throat - Swab, Throat [[id number]]  (Normal) Collected:  12/15/19 1210    Lab Status:  Final result Specimen:  Swab from Throat Updated:  12/17/19 1123     Throat Culture, Beta Strep No Beta Hemolytic Streptococcus Isolated    Narrative:       Group A Strep incidence is low in adults. Positive culture for Beta hemolytic Streptococcus species can reflect colonization and not true infection. Please correlate clinically.    Respiratory Panel, PCR - Swab, Nasopharynx [[id number]]  (Normal) Collected:  12/16/19 2000    Lab Status:  Final result Specimen:  Swab from Nasopharynx Updated:  12/16/19 2147     ADENOVIRUS, PCR Not Detected     Coronavirus 229E Not Detected     Coronavirus HKU1 Not Detected     Coronavirus NL63 Not Detected     Coronavirus OC43 Not Detected     Human Metapneumovirus Not Detected     Human Rhinovirus/Enterovirus Not Detected     Influenza B PCR Not Detected     Parainfluenza Virus 1 Not Detected     Parainfluenza Virus 2 Not Detected     Parainfluenza Virus 3 Not Detected     Parainfluenza Virus 4 Not Detected     Bordetella pertussis pcr Not Detected     Influenza A H1 2009 PCR Not Detected     Chlamydophila pneumoniae PCR Not Detected     Mycoplasma pneumo by PCR Not  "Detected     Influenza A PCR Not Detected     Influenza A H3 Not Detected     Influenza A H1 Not Detected     RSV, PCR Not Detected     Bordetella parapertussis PCR Not Detected    Rapid Strep A Screen - Swab, Throat [[id number]]  (Normal) Collected:  12/15/19 1210    Lab Status:  Final result Specimen:  Swab from Throat Updated:  12/15/19 1241     Strep A Ag Negative    Narrative:       Test performed by Direct Antigen Testing.          Imaging Results (Last 24 Hours)     ** No results found for the last 24 hours. **          Results for orders placed during the hospital encounter of 12/15/19   Adult Transthoracic Echo Complete W/ Cont if Necessary Per Protocol    Narrative · Cardiac chamber sizes and wall thicknesses are normal.  · Global and segmental LV systolic function is normal. The estimated left   ventricular ejection fraction is 70%.  · The aortic and mitral valves are normal in terms of both structure and   function.  · There is trace to mild tricuspid regurgitation with a normal estimated   RV systolic pressure.  · There is a \"trivial\" pericardial effusion.          I have reviewed the medications:  Scheduled Meds:    azithromycin 500 mg Oral Q24H   ketorolac 30 mg Intravenous BID   Morphine 4 mg Intravenous Once   sodium chloride 10 mL Intravenous Q12H     Continuous Infusions:   PRN Meds:.HYDROcodone-acetaminophen  •  sodium chloride  •  sodium chloride      Assessment/Plan   Assessment / Plan     Active Hospital Problems    Diagnosis  POA   • **Myocarditis (CMS/HCC) [I51.4]  Yes     Priority: Medium   • Chlamydia (s/p treatment) [A74.9]  Yes   • Elevated troponin [R79.89]  Yes      Resolved Hospital Problems   No resolved problems to display.        Brief Hospital Course to date:  Vamshi August is a 19 y.o. male with no significant past medical history who presents with chest pain.    Myocarditis  ST elevation  elevated troponins  - slight trop up to 2.3 today.  Chest pain free.  Await " Dr. Cervantes recs for discharge  - per his note Cardiac MR showed EF 55% and some inferolateral hypokinesia and infiltrate c/w myocarditis  - personally reviewed ekg again today which shows persistent ST elevation in infero-lateral leads  - serologic work-up is negative so barry  - continue NSAID for now.  Colchicine stopped per Dr. Cervantes.      Chlamydia  -Patient reports that he was treated about a week ago with azithromycin but PCR remains positive  -Received a dose of Rocephin in the ED.  - continue PO azitho per Dr. Duque      DVT Prophylaxis:  mechanical     Disposition: I expect the patient to be discharged home tomorrow if trop trends down.    CODE STATUS:   Code Status and Medical Interventions:   Ordered at: 12/15/19 2913     Level Of Support Discussed With:    Patient     Code Status:    CPR     Medical Interventions (Level of Support Prior to Arrest):    Full       Electronically signed by Nathen Guerrero MD, 12/18/19, 4:26 PM.

## 2019-12-18 NOTE — PLAN OF CARE
No c/o pain throughout the night. Pt resting comfortably. Awaiting morning labs and EKG results. VSS. Will continue to monitor.

## 2019-12-19 ENCOUNTER — APPOINTMENT (OUTPATIENT)
Dept: CARDIOLOGY | Facility: HOSPITAL | Age: 19
End: 2019-12-19

## 2019-12-19 LAB
BH CV ECHO MEAS - AO ROOT AREA (BSA CORRECTED): 1.2
BH CV ECHO MEAS - AO ROOT AREA: 4.6 CM^2
BH CV ECHO MEAS - AO ROOT DIAM: 2.4 CM
BH CV ECHO MEAS - BSA(HAYCOCK): 2 M^2
BH CV ECHO MEAS - BSA: 2 M^2
BH CV ECHO MEAS - BZI_BMI: 24 KILOGRAMS/M^2
BH CV ECHO MEAS - BZI_METRIC_HEIGHT: 182.9 CM
BH CV ECHO MEAS - BZI_METRIC_WEIGHT: 80.3 KG
BH CV ECHO MEAS - EDV(CUBED): 136 ML
BH CV ECHO MEAS - EDV(MOD-SP2): 103 ML
BH CV ECHO MEAS - EDV(MOD-SP4): 128 ML
BH CV ECHO MEAS - EDV(TEICH): 126.2 ML
BH CV ECHO MEAS - EF(CUBED): 80 %
BH CV ECHO MEAS - EF(MOD-BP): 68 %
BH CV ECHO MEAS - EF(MOD-SP2): 65 %
BH CV ECHO MEAS - EF(MOD-SP4): 73.4 %
BH CV ECHO MEAS - EF(TEICH): 72.1 %
BH CV ECHO MEAS - ESV(CUBED): 27.1 ML
BH CV ECHO MEAS - ESV(MOD-SP2): 36 ML
BH CV ECHO MEAS - ESV(MOD-SP4): 34 ML
BH CV ECHO MEAS - ESV(TEICH): 35.1 ML
BH CV ECHO MEAS - FS: 41.6 %
BH CV ECHO MEAS - IVS/LVPW: 0.86
BH CV ECHO MEAS - IVSD: 0.84 CM
BH CV ECHO MEAS - LA DIMENSION: 3.3 CM
BH CV ECHO MEAS - LA/AO: 1.4
BH CV ECHO MEAS - LV DIASTOLIC VOL/BSA (35-75): 63.3 ML/M^2
BH CV ECHO MEAS - LV MASS(C)D: 166.6 GRAMS
BH CV ECHO MEAS - LV MASS(C)DI: 82.4 GRAMS/M^2
BH CV ECHO MEAS - LV SYSTOLIC VOL/BSA (12-30): 16.8 ML/M^2
BH CV ECHO MEAS - LVIDD: 5.1 CM
BH CV ECHO MEAS - LVIDS: 3 CM
BH CV ECHO MEAS - LVLD AP2: 9 CM
BH CV ECHO MEAS - LVLD AP4: 9.1 CM
BH CV ECHO MEAS - LVLS AP2: 7.1 CM
BH CV ECHO MEAS - LVLS AP4: 8 CM
BH CV ECHO MEAS - LVPWD: 0.97 CM
BH CV ECHO MEAS - SI(CUBED): 53.8 ML/M^2
BH CV ECHO MEAS - SI(MOD-SP2): 33.1 ML/M^2
BH CV ECHO MEAS - SI(MOD-SP4): 46.5 ML/M^2
BH CV ECHO MEAS - SI(TEICH): 45 ML/M^2
BH CV ECHO MEAS - SV(CUBED): 108.8 ML
BH CV ECHO MEAS - SV(MOD-SP2): 67 ML
BH CV ECHO MEAS - SV(MOD-SP4): 94 ML
BH CV ECHO MEAS - SV(TEICH): 91.1 ML
BH CV VAS BP LEFT ARM: NORMAL MMHG
C-ANCA TITR SER IF: NORMAL TITER
CV A16 IGG TITR SER IF: ABNORMAL TITER
CV A16 IGM TITR SER IF: NEGATIVE TITER
CV A24 IGG TITR SER IF: ABNORMAL TITER
CV A24 IGM TITR SER IF: NEGATIVE TITER
CV A7 IGG TITR SER IF: ABNORMAL TITER
CV A7 IGM TITR SER IF: NEGATIVE TITER
CV A9 IGG TITR SER IF: ABNORMAL TITER
CV A9 IGM TITR SER IF: NEGATIVE TITER
MAXIMAL PREDICTED HEART RATE: 200 BPM
MYELOPEROXIDASE AB SER-ACNC: <9 U/ML (ref 0–9)
P-ANCA ATYPICAL TITR SER IF: NORMAL TITER
P-ANCA TITR SER IF: NORMAL TITER
PROTEINASE3 AB SER IA-ACNC: <3.5 U/ML (ref 0–3.5)
STRESS TARGET HR: 170 BPM
TROPONIN T SERPL-MCNC: 1.25 NG/ML (ref 0–0.03)

## 2019-12-19 PROCEDURE — 93308 TTE F-UP OR LMTD: CPT | Performed by: INTERNAL MEDICINE

## 2019-12-19 PROCEDURE — 93308 TTE F-UP OR LMTD: CPT

## 2019-12-19 PROCEDURE — 84484 ASSAY OF TROPONIN QUANT: CPT | Performed by: INTERNAL MEDICINE

## 2019-12-19 PROCEDURE — 99232 SBSQ HOSP IP/OBS MODERATE 35: CPT | Performed by: INTERNAL MEDICINE

## 2019-12-19 RX ORDER — NAPROXEN 250 MG/1
250 TABLET ORAL 2 TIMES DAILY WITH MEALS
Status: DISCONTINUED | OUTPATIENT
Start: 2019-12-19 | End: 2019-12-20 | Stop reason: HOSPADM

## 2019-12-19 RX ORDER — AZITHROMYCIN 500 MG/1
500 TABLET, FILM COATED ORAL DAILY
Qty: 4 TABLET | Refills: 0 | Status: SHIPPED | OUTPATIENT
Start: 2019-12-19 | End: 2019-12-23

## 2019-12-19 RX ADMIN — NAPROXEN 250 MG: 250 TABLET ORAL at 14:23

## 2019-12-19 RX ADMIN — AZITHROMYCIN 500 MG: 250 TABLET, FILM COATED ORAL at 09:05

## 2019-12-19 RX ADMIN — SODIUM CHLORIDE, PRESERVATIVE FREE 10 ML: 5 INJECTION INTRAVENOUS at 22:34

## 2019-12-19 NOTE — PROGRESS NOTES
1       Vamshi August  2000  [id number]  12/19/2019    CC: Acute myopericarditis    Vamshi August is a 19 y.o. male here for chest pain, pharyngitis, diffuse ST segment elevation, and increased troponin levels with associated rise in inflammatory markers.      Past medical history:  Past Medical History:   Diagnosis Date   • Known health problems: none        Medications:   Current Facility-Administered Medications:   •  azithromycin (ZITHROMAX) tablet 500 mg, 500 mg, Oral, Q24H, Esthela Kat MD, 500 mg at 12/18/19 0957  •  HYDROcodone-acetaminophen (NORCO) 5-325 MG per tablet 1 tablet, 1 tablet, Oral, Q6H PRN, Esthela Kat MD, 1 tablet at 12/17/19 0947  •  ketorolac (TORADOL) injection 30 mg, 30 mg, Intravenous, BID, Fredi Cervantes MD, 30 mg at 12/18/19 2143  •  Morphine sulfate (PF) injection 4 mg, 4 mg, Intravenous, Once, Riccardo Ramos MD, Stopped at 12/15/19 1322  •  sodium chloride 0.9 % flush 10 mL, 10 mL, Intravenous, PRN, Riccardo Ramos MD  •  sodium chloride 0.9 % flush 10 mL, 10 mL, Intravenous, Q12H, Jona Chase APRN, 10 mL at 12/18/19 2144  •  sodium chloride 0.9 % flush 10 mL, 10 mL, Intravenous, PRN, Jona Chase APRN  Antibiotics:  Anti-Infectives (From admission, onward)    Ordered     Dose/Rate Route Frequency Start Stop    12/19/19 0807  azithromycin (ZITHROMAX) 500 MG tablet     Ordering Provider:  Nathen Guerrero MD    500 mg Oral Daily 12/19/19 0000 12/23/19 8218    12/16/19 1312  azithromycin (ZITHROMAX) tablet 500 mg  Review   Ordering Provider:  Esthela Kat MD    500 mg Oral Every 24 Hours Scheduled 12/16/19 1400 12/23/19 0859    12/15/19 1250  cefTRIAXone (ROCEPHIN) 1 g/100 mL 0.9% NS (MBP)     Ordering Provider:  Giuliana Garcia APRN    1 g  over 30 Minutes Intravenous Once 12/15/19 1252 12/15/19 1404          Allergies:  has No Known Allergies.    Review of Systems: All other reviewed and negative except  "as per HPI    Blood pressure 107/68, pulse 66, temperature 98.3 °F (36.8 °C), temperature source Oral, resp. rate 18, height 182.9 cm (72\"), weight 80.5 kg (177 lb 6.4 oz), SpO2 96 %.  GENERAL: Awake and alert, in no acute distress.  Denies fever chills or night sweats.  Chest pain largely resolved.  No pharyngeal discomfort.  HEENT: Oropharynx without thrush. Sinuses nontender. Dentition in good repair. No cervical adenopathy. No carotid bruits/ jugular venous distention.   EYES: PERRL. No conjunctival injection. No icterus. EOMI.  LYMPHATICS: No lymphadenopathy of the neck or axillary or inguinal regions.   HEART: No murmur of mitral regurgitation, gallop, or pericardial friction rub.   LUNGS: Clear to auscultation anteriorly. No percussion dullness.   ABDOMEN: Soft, nontender, nondistended. No appreciable HSM.    SKIN: Warm and dry without cutaneous eruptions. No embolic stigmata.   PSYCHIATRIC: Mental status lucid. Cranial nerve function intact.       DIAGNOSTICS:  Lab Results   Component Value Date    WBC 7.57 12/16/2019    HGB 12.1 (L) 12/16/2019    HCT 36.9 (L) 12/16/2019     12/16/2019     Lab Results   Component Value Date    CRP 12.03 (H) 12/15/2019     Lab Results   Component Value Date    SEDRATE 17 (H) 12/15/2019     Lab Results   Component Value Date    GLUCOSE 91 12/18/2019    BUN 12 12/18/2019    CREATININE 0.82 12/18/2019    EGFRIFNONA 120 12/18/2019    BCR 14.6 12/18/2019    CO2 27.0 12/18/2019    CALCIUM 8.9 12/18/2019    ALBUMIN 3.60 12/15/2019    AST 48 (H) 12/15/2019    ALT 25 12/15/2019       Microbiology: Bio fire respiratory viral pathogens panel negative.  Blood cultures x2 unremarkable.  Throat culture negative for beta-hemolytic streptococci.    RADIOLOGY:  Imaging Results (Last 72 Hours)     Procedure Component Value Units Date/Time    MRI Cardiac Function Complete With & Without Morphology [[id number]] Resulted:  12/16/19 1516     Updated:  12/17/19 1410          Assessment and " Plan: Acute myopericarditis.  Diffuse ST segment elevation.  Increased troponin levels.  No significant mitral regurgitation or regional wall motion abnormalities by transthoracic echo- cardiography/preserved left ventricular ejection fraction.  Increased inflammatory markers.  Recent nongonococcal urethritis/positive chlamydia PCR on urine/ongoing therapy with azithromycin.  Maximum temperature over 24 hours 98.0.  Currently 98.3.  Pulse 66, respiratory rate 18, blood pressure 107/68 mmHg.  O2 saturation 96% on room air.  Peripheral leukocyte count 7.6.  CRP 12/sed rate 17.  Plasma creatinine 0.82.  The toxoplasma IgM antibody is equivocal.  The toxoplasma IgG antibody is positive.  It is difficult to know whether this represents recent infection or remote exposure.  Antinuclear antibodies are negative.  Rheumatoid factor is unremarkable.  Angiotensin-converting enzyme level is normal.  Of note, the troponin level continues to rise but appears to have peaked on 12/18 at 2.36 and has fallen to 1.25 today.  I do not appreciate a murmur of mitral regurgitation.  A repeat transthoracic echocardiogram may be warranted to assess for mitral valve dysfunction or a depression in the left ventricular ejection fraction.  Either of these findings would probably warrant endomyocardial biopsy and empiric corticosteroids and IVIG.  I attempted to review the MRI report but am not left with any helpful clinical information.  Complex medical decision making.      Reyes Duque MD  12/19/2019

## 2019-12-19 NOTE — PROGRESS NOTES
"[facility] at [facility]  PROGRESS NOTE      Date of Admission: 12/15/2019  Length of Stay: 3  Primary Care Physician: Shayy Aguilar APRN    Chief Complaint: Followup myocarditis.    Problem List:   1. Pericarditis  A. Presentation to [facility] ED with acute onset chest pain 12/15/2019  B. EKG consistent with pericarditis, elevated ESR and C-RP              C. Elevated inflammatory marker, troponin, Echo with nl LV, no pericardial rub or                  effusion.              D. Cardiac MRI c/w myocarditis  2. Sore throat              A. On Amoxicillin for 3 days (at admission).     Subjective      HPI: He has been pain-free for approximately 48 hours.  He has no symptoms of shortness of air.  Arrhythmias are not reported.      Objective   Vital Signs:  Temp:  [97.7 °F (36.5 °C)-98.3 °F (36.8 °C)] 98.3 °F (36.8 °C)  Heart Rate:  [64-66] 66  Resp:  [16-18] 18  BP: ()/(55-68) 107/68    Intake/Output Summary (Last 24 hours) at 12/19/2019 1004  Last data filed at 12/18/2019 1700  Gross per 24 hour   Intake 840 ml   Output 1510 ml   Net -670 ml     Flowsheet Rows      First Filed Value   Admission Height  182.9 cm (72\") Documented at 12/15/2019 1137   Admission Weight  79.4 kg (175 lb) Documented at 12/15/2019 1137          Physical Exam:   HEENT: Fundoscopic deferred, otherwise unremarkable.  NECK: No Jugular venous distention, adenopathy, or thyromegaly noted.   HEART: No discrete PMI is noted. The 1st and 2nd heart sounds are normal, and no murmurs, gallops, rubs, clicks, or other sounds are noted.  LUNGS: Clear to auscultation.  ABDOMEN: Flat without evidence of organomegaly, masses, or tenderness.  NEUROLOGIC: No focal abnormalities involving strength or sensation are noted.   EXTREMITIES: No clubbing, cyanosis, or edema noted.         Results Review:   Results from last 7 days   Lab Units 12/18/19  0557 12/16/19  0500 12/15/19  1158   SODIUM mmol/L 140 139 141   POTASSIUM mmol/L 4.0 " 4.0 4.0   CHLORIDE mmol/L 103 107 104   CO2 mmol/L 27.0 23.0 24.0   BUN mg/dL 12 14 15   CREATININE mg/dL 0.82 0.83 1.07   GLUCOSE mg/dL 91 106* 120*   CALCIUM mg/dL 8.9 8.6 9.1     Results from last 7 days   Lab Units 12/19/19  0410 12/18/19  0557 12/17/19  0326 12/16/19  1028 12/16/19  0500 12/15/19  1354 12/15/19  1158   CK TOTAL U/L  --   --   --   --   --   --  139   TROPONIN T ng/mL 1.250* 2.360* 2.200* 1.100* 0.708* 0.398* 0.278*     Results from last 7 days   Lab Units 12/16/19  0500 12/15/19  1158   WBC 10*3/mm3 7.57 8.16   HEMOGLOBIN g/dL 12.1* 13.8   HEMATOCRIT % 36.9* 42.5   PLATELETS 10*3/mm3 236 197                               Current Medications:    azithromycin 500 mg Oral Q24H   ketorolac 30 mg Intravenous BID   Morphine 4 mg Intravenous Once   sodium chloride 10 mL Intravenous Q12H            I reviewed the patient's new clinical results.  I personally viewed and interpreted the patient's EKG/Telemetry data    Assessment and Plan:     #1: I appreciate the help of Reyes Duque MD.  At this time, serologic evaluation has been noncontributory.    #2: The patient has been pain-free for 48 hours and troponin, for the first time today, has fallen a bit.  His EKG however is unchanged with persistent ST segment elevation fairly confined to the inferolateral leads.    #3: In the absence of pain or obvious pericardial involvement we need to cut back his nonsteroidal dose and I will do this today.    #4: I fully agree with Dr. Duque's recommendations that endomyocardial biopsy will be indicated should following left ventricular function be noted.  This is in accordance with current ACC guidelines.      Fredi Cervantes MD  12/19/19  10:04 AM

## 2019-12-19 NOTE — DISCHARGE SUMMARY
[facility] Medicine Services  DISCHARGE SUMMARY    Patient Name: Vamshi August  : 2000  MRN: [id number]    Date of Admission: 12/15/2019 11:53 AM  Date of Discharge:  19  Primary Care Physician: Shayy Aguilar APRN    Consults     Date and Time Order Name Status Description    2019 Inpatient Infectious Diseases Consult Completed           Hospital Course     Presenting Problem:   Acute pericarditis, unspecified type [I30.9]  Myocarditis (CMS/HCC) [I51.4]    Active Hospital Problems    Diagnosis  POA   • **Myocarditis (CMS/HCC) [I51.4]  Yes     Priority: Medium   • Chlamydia (s/p treatment) [A74.9]  Yes   • Elevated troponin [R79.89]  Yes      Resolved Hospital Problems   No resolved problems to display.          Hospital Course:  Vamshi August is a 19 y.o. male with no chronic medical illnesses who presented to the emergency room with complaints of severe chest pain over the prior 24 hours.  Had a mildly elevated troponin and some ST elevation on the EKG that was consistent with a myocarditis.  Recently been treated for pharyngitis with 3 days of oral amoxicillin.  Of note also been recently treated for chlamydia.  He was admitted to the hospital service with cardiology consulting.  Echocardiogram showed preserved ejection fraction without wall motion abnormalities.  Cardiac MRI was done which per cardiology notes showed a EF of 55%, infiltrate consistent with myocarditis, and some inferior lateral hypokinesis.  Troponins peaked around 2.3 and then trended down.  Patient initially placed on NSAIDs and colchicine however colchicine was stopped.  Pain improved.  Also seen by infectious disease and multiple serologies sent which are all either negative or pending at this time.  He is doing quite well day of discharge and ready to go home.  He will follow-up with Dr. Cervantes from cardiology per his recommendations.  He also follow-up  with Dr. Duque from infectious disease in 1 week.  He will complete an additional 4 days of oral azithromycin.      Discharge Follow Up Recommendations for labs/diagnostics:  Will follow up with Dr. Cervantes and Dr. Duque.    Day of Discharge     HPI:   No problems overnight.  No further CP.  Wants to go home.    Review of Systems  Gen- No fevers, chills  CV- No chest pain, palpitations  Resp- No cough, dyspnea  GI- No N/V/D, abd pain    Otherwise ROS is negative except as mentioned in the HPI.    Vital Signs:   Temp:  [97.7 °F (36.5 °C)-98.3 °F (36.8 °C)] 98.3 °F (36.8 °C)  Heart Rate:  [64-66] 66  Resp:  [16-18] 18  BP: ()/(55-68) 107/68     Physical Exam:  Constitutional: No acute distress, awake, alert, age appropriate  HENT: NCAT, mucous membranes moist  Respiratory: Clear to auscultation bilaterally, respiratory effort normal   Cardiovascular: RRR, no murmurs, rubs, or gallops   Gastrointestinal: Positive bowel sounds, soft, nontender, nondistended  Musculoskeletal: No bilateral ankle edema  Psychiatric: Appropriate affect, cooperative  Neurologic: Oriented x 3, no focal deficits  Skin: No rashes      Pertinent  and/or Most Recent Results     Results from last 7 days   Lab Units 12/18/19  0557 12/16/19  0500 12/15/19  1158   WBC 10*3/mm3  --  7.57 8.16   HEMOGLOBIN g/dL  --  12.1* 13.8   HEMATOCRIT %  --  36.9* 42.5   PLATELETS 10*3/mm3  --  236 197   SODIUM mmol/L 140 139 141   POTASSIUM mmol/L 4.0 4.0 4.0   CHLORIDE mmol/L 103 107 104   CO2 mmol/L 27.0 23.0 24.0   BUN mg/dL 12 14 15   CREATININE mg/dL 0.82 0.83 1.07   GLUCOSE mg/dL 91 106* 120*   CALCIUM mg/dL 8.9 8.6 9.1     Results from last 7 days   Lab Units 12/15/19  1158   BILIRUBIN mg/dL 0.4   ALK PHOS U/L 130*   ALT (SGPT) U/L 25   AST (SGOT) U/L 48*           Invalid input(s): TG, LDLCALC, LDLREALC  Results from last 7 days   Lab Units 12/19/19  0410 12/18/19  0557 12/17/19  0326 12/16/19  1028 12/16/19  0500  12/15/19  1300 12/15/19  1158    PROBNP pg/mL  --   --   --   --   --   --   --  204.5   TROPONIN T ng/mL 1.250* 2.360* 2.200* 1.100* 0.708*   < >  --  0.278*   LACTATE mmol/L  --   --   --   --   --   --  1.3  --     < > = values in this interval not displayed.       Brief Urine Lab Results  (Last result in the past 365 days)      Color   Clarity   Blood   Leuk Est   Nitrite   Protein   CREAT   Urine HCG        12/09/19 1247 Dark Yellow Clear Negative Negative Negative Negative               Microbiology Results Abnormal     Procedure Component Value - Date/Time    Blood Culture - Blood, Arm, Right [[id number]] Collected:  12/15/19 1300    Lab Status:  Preliminary result Specimen:  Blood from Arm, Right Updated:  12/18/19 1315     Blood Culture No growth at 3 days    Blood Culture - Blood, Arm, Right [[id number]] Collected:  12/15/19 1300    Lab Status:  Preliminary result Specimen:  Blood from Arm, Right Updated:  12/18/19 1315     Blood Culture No growth at 3 days    Beta Strep Culture, Throat - Swab, Throat [[id number]]  (Normal) Collected:  12/15/19 1210    Lab Status:  Final result Specimen:  Swab from Throat Updated:  12/17/19 1123     Throat Culture, Beta Strep No Beta Hemolytic Streptococcus Isolated    Narrative:       Group A Strep incidence is low in adults. Positive culture for Beta hemolytic Streptococcus species can reflect colonization and not true infection. Please correlate clinically.    Respiratory Panel, PCR - Swab, Nasopharynx [[id number]]  (Normal) Collected:  12/16/19 2000    Lab Status:  Final result Specimen:  Swab from Nasopharynx Updated:  12/16/19 2147     ADENOVIRUS, PCR Not Detected     Coronavirus 229E Not Detected     Coronavirus HKU1 Not Detected     Coronavirus NL63 Not Detected     Coronavirus OC43 Not Detected     Human Metapneumovirus Not Detected     Human Rhinovirus/Enterovirus Not Detected     Influenza B PCR Not Detected     Parainfluenza Virus 1 Not Detected     Parainfluenza Virus 2 Not Detected      "Parainfluenza Virus 3 Not Detected     Parainfluenza Virus 4 Not Detected     Bordetella pertussis pcr Not Detected     Influenza A H1 2009 PCR Not Detected     Chlamydophila pneumoniae PCR Not Detected     Mycoplasma pneumo by PCR Not Detected     Influenza A PCR Not Detected     Influenza A H3 Not Detected     Influenza A H1 Not Detected     RSV, PCR Not Detected     Bordetella parapertussis PCR Not Detected    Rapid Strep A Screen - Swab, Throat [[id number]]  (Normal) Collected:  12/15/19 1210    Lab Status:  Final result Specimen:  Swab from Throat Updated:  12/15/19 1241     Strep A Ag Negative    Narrative:       Test performed by Direct Antigen Testing.          Imaging Results (All)     Procedure Component Value Units Date/Time    MRI Cardiac Function Complete With & Without Morphology [[id number]] Resulted:  12/16/19 1516     Updated:  12/17/19 1410    XR Chest 1 View [[id number]] Collected:  12/15/19 1246     Updated:  12/15/19 1742    Narrative:          EXAMINATION: XR CHEST 1 VW-      INDICATION: Chest Pain triage protocol      COMPARISON: NONE     FINDINGS: There is a normal cardiac silhouette. There is no pulmonary  inflammatory process, mass or effusion.           Impression:       No active disease     This report was finalized on 12/15/2019 5:38 PM by Dr. Henok Rajan MD.                       Results for orders placed during the hospital encounter of 12/15/19   Adult Transthoracic Echo Complete W/ Cont if Necessary Per Protocol    Narrative · Cardiac chamber sizes and wall thicknesses are normal.  · Global and segmental LV systolic function is normal. The estimated left   ventricular ejection fraction is 70%.  · The aortic and mitral valves are normal in terms of both structure and   function.  · There is trace to mild tricuspid regurgitation with a normal estimated   RV systolic pressure.  · There is a \"trivial\" pericardial effusion.           Order Current Status    ANCA Panel In process    " Coxsackie A IgG / IgM Ab In process    Blood Culture - Blood, Arm, Right Preliminary result    Blood Culture - Blood, Arm, Right Preliminary result        Discharge Details        Discharge Medications      New Medications      Instructions Start Date   azithromycin 500 MG tablet  Commonly known as:  ZITHROMAX   500 mg, Oral, Daily         Continue These Medications      Instructions Start Date   ibuprofen 200 MG tablet  Commonly known as:  ADVIL,MOTRIN   800 mg, Oral, Every 6 Hours PRN         ASK your doctor about these medications      Instructions Start Date   amoxicillin 500 MG capsule  Commonly known as:  AMOXIL  Ask about: Which instructions should I use?   500 mg, Oral, 2 Times Daily             No Known Allergies      Discharge Disposition:  Home or Self Care    Diet:  Hospital:  Diet Order   Procedures   • Diet Regular       Activity:  Activity Instructions     Activity as Tolerated               CODE STATUS:    Code Status and Medical Interventions:   Ordered at: 12/15/19 0963     Level Of Support Discussed With:    Patient     Code Status:    CPR     Medical Interventions (Level of Support Prior to Arrest):    Full       Future Appointments   Date Time Provider Department Center   1/10/2020  8:30 AM Shayy Aguilar APRN [facility] None       Additional Instructions for the Follow-ups that You Need to Schedule     Discharge Follow-up with Specified Provider: Dr. Cervantes per his directions   As directed      To:  Dr. Cervantes per his directions         Discharge Follow-up with Specified Provider: Dr. Duque 1 week   As directed      To:  Dr. Duque 1 week               Time Spent on Discharge:  25 minutes    Electronically signed by Nathen Guerrero MD, 12/19/19, 8:08 AM.

## 2019-12-19 NOTE — PAYOR COMM NOTE
"Lisa Marques RN  Utilization Review  P: [phone]  F: [phone]    Ref # [id number]  DC date = 19  DC summary attached      Vamshi August (19 y.o. Male)     Date of Birth Social Security Number Address Home Phone MRN    2000  [address] [id number] [phone] [id number]    Christianity Marital Status          None Single       Admission Date Admission Type Admitting Provider Attending Provider Department, Room/Bed    12/15/19 Emergency Nathen Guerrero MD [place], Lee M, MD [facility] 4G, S459/1    Discharge Date Discharge Disposition Discharge Destination         Home or Self Care              Attending Provider:  Nathen Guerrero MD    Allergies:  No Known Allergies    Isolation:  None   Infection:  None   Code Status:  CPR    Ht:  182.9 cm (72.01\")   Wt:  80.3 kg (177 lb)    Admission Cmt:  None   Principal Problem:  Myocarditis (CMS/HCC) [I51.4]                 Active Insurance as of 12/15/2019     Primary Coverage     Payor Plan Insurance Group Employer/Plan Group    [employer] [place] [employer] [place]      Payor Plan Address Payor Plan Phone Number Payor Plan Fax Number Effective Dates    [address] [address] [phone]  2019 - None Entered    [facility] [id number]       Subscriber Name Subscriber Birth Date Member ID       VAMSHI AUGUST 2000 [id number]                 Emergency Contacts      (Rel.) Home Phone Work Phone Mobile Phone    Lucie August (Mother) [phone] -- --               Discharge Summary      Nathen Guerrero MD at 19 0807              [facility] Medicine Services  DISCHARGE SUMMARY    Patient Name: Vamshi August  : 2000  MRN: [id number]    Date of Admission: 12/15/2019 11:53 AM  Date of Discharge:  19  Primary Care Physician: Shayy Aguilar APRN    Consults     Date and Time Order Name Status Description    2019 " Inpatient Infectious Diseases Consult Completed           Hospital Course     Presenting Problem:   Acute pericarditis, unspecified type [I30.9]  Myocarditis (CMS/HCC) [I51.4]    Active Hospital Problems    Diagnosis  POA   • **Myocarditis (CMS/HCC) [I51.4]  Yes     Priority: Medium   • Chlamydia (s/p treatment) [A74.9]  Yes   • Elevated troponin [R79.89]  Yes      Resolved Hospital Problems   No resolved problems to display.          Hospital Course:  Vamshi August is a 19 y.o. male with no chronic medical illnesses who presented to the emergency room with complaints of severe chest pain over the prior 24 hours.  Had a mildly elevated troponin and some ST elevation on the EKG that was consistent with a myocarditis.  Recently been treated for pharyngitis with 3 days of oral amoxicillin.  Of note also been recently treated for chlamydia.  He was admitted to the hospital service with cardiology consulting.  Echocardiogram showed preserved ejection fraction without wall motion abnormalities.  Cardiac MRI was done which per cardiology notes showed a EF of 55%, infiltrate consistent with myocarditis, and some inferior lateral hypokinesis.  Troponins peaked around 2.3 and then trended down.  Patient initially placed on NSAIDs and colchicine however colchicine was stopped.  Pain improved.  Also seen by infectious disease and multiple serologies sent which are all either negative or pending at this time.  He is doing quite well day of discharge and ready to go home.  He will follow-up with Dr. Cervantes from cardiology per his recommendations.  He also follow-up with Dr. Duque from infectious disease in 1 week.  He will complete an additional 4 days of oral azithromycin.      Discharge Follow Up Recommendations for labs/diagnostics:  Will follow up with Dr. Cervantes and Dr. Duque.    Day of Discharge     HPI:   No problems overnight.  No further CP.  Wants to go home.    Review of Systems  Gen- No fevers,  chills  CV- No chest pain, palpitations  Resp- No cough, dyspnea  GI- No N/V/D, abd pain    Otherwise ROS is negative except as mentioned in the HPI.    Vital Signs:   Temp:  [97.7 °F (36.5 °C)-98.3 °F (36.8 °C)] 98.3 °F (36.8 °C)  Heart Rate:  [64-66] 66  Resp:  [16-18] 18  BP: ()/(55-68) 107/68     Physical Exam:  Constitutional: No acute distress, awake, alert, age appropriate  HENT: NCAT, mucous membranes moist  Respiratory: Clear to auscultation bilaterally, respiratory effort normal   Cardiovascular: RRR, no murmurs, rubs, or gallops   Gastrointestinal: Positive bowel sounds, soft, nontender, nondistended  Musculoskeletal: No bilateral ankle edema  Psychiatric: Appropriate affect, cooperative  Neurologic: Oriented x 3, no focal deficits  Skin: No rashes      Pertinent  and/or Most Recent Results     Results from last 7 days   Lab Units 12/18/19  0557 12/16/19  0500 12/15/19  1158   WBC 10*3/mm3  --  7.57 8.16   HEMOGLOBIN g/dL  --  12.1* 13.8   HEMATOCRIT %  --  36.9* 42.5   PLATELETS 10*3/mm3  --  236 197   SODIUM mmol/L 140 139 141   POTASSIUM mmol/L 4.0 4.0 4.0   CHLORIDE mmol/L 103 107 104   CO2 mmol/L 27.0 23.0 24.0   BUN mg/dL 12 14 15   CREATININE mg/dL 0.82 0.83 1.07   GLUCOSE mg/dL 91 106* 120*   CALCIUM mg/dL 8.9 8.6 9.1     Results from last 7 days   Lab Units 12/15/19  1158   BILIRUBIN mg/dL 0.4   ALK PHOS U/L 130*   ALT (SGPT) U/L 25   AST (SGOT) U/L 48*           Invalid input(s): TG, LDLCALC, LDLREALC  Results from last 7 days   Lab Units 12/19/19  0410 12/18/19  0557 12/17/19  0326 12/16/19  1028 12/16/19  0500  12/15/19  1300 12/15/19  1158   PROBNP pg/mL  --   --   --   --   --   --   --  204.5   TROPONIN T ng/mL 1.250* 2.360* 2.200* 1.100* 0.708*   < >  --  0.278*   LACTATE mmol/L  --   --   --   --   --   --  1.3  --     < > = values in this interval not displayed.       Brief Urine Lab Results  (Last result in the past 365 days)      Color   Clarity   Blood   Leuk Est   Nitrite    Protein   CREAT   Urine HCG        12/09/19 1247 Dark Yellow Clear Negative Negative Negative Negative               Microbiology Results Abnormal     Procedure Component Value - Date/Time    Blood Culture - Blood, Arm, Right [[id number]] Collected:  12/15/19 1300    Lab Status:  Preliminary result Specimen:  Blood from Arm, Right Updated:  12/18/19 1315     Blood Culture No growth at 3 days    Blood Culture - Blood, Arm, Right [[id number]] Collected:  12/15/19 1300    Lab Status:  Preliminary result Specimen:  Blood from Arm, Right Updated:  12/18/19 1315     Blood Culture No growth at 3 days    Beta Strep Culture, Throat - Swab, Throat [[id number]]  (Normal) Collected:  12/15/19 1210    Lab Status:  Final result Specimen:  Swab from Throat Updated:  12/17/19 1123     Throat Culture, Beta Strep No Beta Hemolytic Streptococcus Isolated    Narrative:       Group A Strep incidence is low in adults. Positive culture for Beta hemolytic Streptococcus species can reflect colonization and not true infection. Please correlate clinically.    Respiratory Panel, PCR - Swab, Nasopharynx [[id number]]  (Normal) Collected:  12/16/19 2000    Lab Status:  Final result Specimen:  Swab from Nasopharynx Updated:  12/16/19 2147     ADENOVIRUS, PCR Not Detected     Coronavirus 229E Not Detected     Coronavirus HKU1 Not Detected     Coronavirus NL63 Not Detected     Coronavirus OC43 Not Detected     Human Metapneumovirus Not Detected     Human Rhinovirus/Enterovirus Not Detected     Influenza B PCR Not Detected     Parainfluenza Virus 1 Not Detected     Parainfluenza Virus 2 Not Detected     Parainfluenza Virus 3 Not Detected     Parainfluenza Virus 4 Not Detected     Bordetella pertussis pcr Not Detected     Influenza A H1 2009 PCR Not Detected     Chlamydophila pneumoniae PCR Not Detected     Mycoplasma pneumo by PCR Not Detected     Influenza A PCR Not Detected     Influenza A H3 Not Detected     Influenza A H1 Not Detected     RSV, PCR  "Not Detected     Bordetella parapertussis PCR Not Detected    Rapid Strep A Screen - Swab, Throat [[id number]]  (Normal) Collected:  12/15/19 1210    Lab Status:  Final result Specimen:  Swab from Throat Updated:  12/15/19 1241     Strep A Ag Negative    Narrative:       Test performed by Direct Antigen Testing.          Imaging Results (All)     Procedure Component Value Units Date/Time    MRI Cardiac Function Complete With & Without Morphology [[id number]] Resulted:  12/16/19 1516     Updated:  12/17/19 1410    XR Chest 1 View [[id number]] Collected:  12/15/19 1246     Updated:  12/15/19 4992    Narrative:          EXAMINATION: XR CHEST 1 VW-      INDICATION: Chest Pain triage protocol      COMPARISON: NONE     FINDINGS: There is a normal cardiac silhouette. There is no pulmonary  inflammatory process, mass or effusion.           Impression:       No active disease     This report was finalized on 12/15/2019 5:38 PM by Dr. Henok Rajan MD.                       Results for orders placed during the hospital encounter of 12/15/19   Adult Transthoracic Echo Complete W/ Cont if Necessary Per Protocol    Narrative · Cardiac chamber sizes and wall thicknesses are normal.  · Global and segmental LV systolic function is normal. The estimated left   ventricular ejection fraction is 70%.  · The aortic and mitral valves are normal in terms of both structure and   function.  · There is trace to mild tricuspid regurgitation with a normal estimated   RV systolic pressure.  · There is a \"trivial\" pericardial effusion.           Order Current Status    ANCA Panel In process    Coxsackie A IgG / IgM Ab In process    Blood Culture - Blood, Arm, Right Preliminary result    Blood Culture - Blood, Arm, Right Preliminary result        Discharge Details        Discharge Medications      New Medications      Instructions Start Date   azithromycin 500 MG tablet  Commonly known as:  ZITHROMAX   500 mg, Oral, Daily         Continue These " Medications      Instructions Start Date   ibuprofen 200 MG tablet  Commonly known as:  ADVIL,MOTRIN   800 mg, Oral, Every 6 Hours PRN         ASK your doctor about these medications      Instructions Start Date   amoxicillin 500 MG capsule  Commonly known as:  AMOXIL  Ask about: Which instructions should I use?   500 mg, Oral, 2 Times Daily             No Known Allergies      Discharge Disposition:  Home or Self Care    Diet:  Hospital:  Diet Order   Procedures   • Diet Regular       Activity:  Activity Instructions     Activity as Tolerated               CODE STATUS:    Code Status and Medical Interventions:   Ordered at: 12/15/19 1920     Level Of Support Discussed With:    Patient     Code Status:    CPR     Medical Interventions (Level of Support Prior to Arrest):    Full       Future Appointments   Date Time Provider Department Center   1/10/2020  8:30 AM Shayy Aguilar APRN [facility] None       Additional Instructions for the Follow-ups that You Need to Schedule     Discharge Follow-up with Specified Provider: Dr. Cervantes per his directions   As directed      To:  Dr. Cervantes per his directions         Discharge Follow-up with Specified Provider: Dr. Duque 1 week   As directed      To:  Dr. Duque 1 week               Time Spent on Discharge:  25 minutes    Electronically signed by Nathen Guerrero MD, 12/19/19, 8:08 AM.      Electronically signed by Nathen Guerrero MD at 12/19/19 0820

## 2019-12-20 VITALS
HEART RATE: 68 BPM | RESPIRATION RATE: 18 BRPM | HEIGHT: 72 IN | BODY MASS INDEX: 23.98 KG/M2 | OXYGEN SATURATION: 96 % | DIASTOLIC BLOOD PRESSURE: 55 MMHG | WEIGHT: 177 LBS | SYSTOLIC BLOOD PRESSURE: 100 MMHG | TEMPERATURE: 97.9 F

## 2019-12-20 LAB
BACTERIA SPEC AEROBE CULT: NORMAL
BACTERIA SPEC AEROBE CULT: NORMAL
TROPONIN T SERPL-MCNC: 0.36 NG/ML (ref 0–0.03)

## 2019-12-20 PROCEDURE — 84484 ASSAY OF TROPONIN QUANT: CPT | Performed by: PHYSICIAN ASSISTANT

## 2019-12-20 PROCEDURE — 99231 SBSQ HOSP IP/OBS SF/LOW 25: CPT | Performed by: INTERNAL MEDICINE

## 2019-12-20 PROCEDURE — 99238 HOSP IP/OBS DSCHRG MGMT 30/<: CPT | Performed by: INTERNAL MEDICINE

## 2019-12-20 RX ORDER — NAPROXEN 250 MG/1
250 TABLET ORAL 2 TIMES DAILY PRN
Qty: 60 TABLET | Refills: 2 | Status: SHIPPED | OUTPATIENT
Start: 2019-12-20 | End: 2020-02-18

## 2019-12-20 RX ADMIN — NAPROXEN 250 MG: 250 TABLET ORAL at 08:33

## 2019-12-20 RX ADMIN — SODIUM CHLORIDE, PRESERVATIVE FREE 10 ML: 5 INJECTION INTRAVENOUS at 08:33

## 2019-12-20 RX ADMIN — AZITHROMYCIN 500 MG: 250 TABLET, FILM COATED ORAL at 08:33

## 2019-12-20 NOTE — DISCHARGE SUMMARY
[facility] Medicine Services  DISCHARGE SUMMARY    Patient Name: Vamshi August  : 2000  MRN: [id number]    Date of Admission: 12/15/2019 11:53 AM  Date of Discharge:  19  Primary Care Physician: Shayy Aguilar APRN    Consults     Date and Time Order Name Status Description    2019 Inpatient Infectious Diseases Consult Completed           Hospital Course     Presenting Problem:   Acute pericarditis, unspecified type [I30.9]  Myocarditis (CMS/HCC) [I51.4]    Active Hospital Problems    Diagnosis  POA   • **Myocarditis (CMS/HCC) [I51.4]  Yes     Priority: Medium   • Chlamydia (s/p treatment) [A74.9]  Yes   • Elevated troponin [R79.89]  Yes      Resolved Hospital Problems   No resolved problems to display.          Hospital Course:  Vamshi August is a 19 y.o. male with no chronic medical illnesses who presented to the emergency room with complaints of severe chest pain over the prior 24 hours.  Had a mildly elevated troponin and some ST elevation on the EKG that was consistent with a myocarditis.  Recently been treated for pharyngitis with 3 days of oral amoxicillin.  Of note also been recently treated for chlamydia.  He was admitted to the hospital service with cardiology consulting.  Echocardiogram showed preserved ejection fraction without wall motion abnormalities.  Cardiac MRI was done which per cardiology notes showed a EF of 55%, infiltrate consistent with myocarditis, and some inferior lateral hypokinesis.  Troponins peaked around 2.3 and then trended down.  Patient initially placed on NSAIDs and colchicine however colchicine was stopped.  Pain improved.  Also seen by infectious disease and multiple serologies sent which are all either negative or pending at this time.  Repeat echocardiogram showed a preserved ejection fraction with no pericardial effusion.  Troponin continue to come abraham and a chest pain-free.n   He is doing  quite well day of discharge and ready to go home.  He will follow-up with  He is to ultrasound on 1/2 with a repeat troponin and EKG follow-up with Dr. Duque from infectious disease in 1 week.  He will complete an additional 4 days of oral azithromycin.      Discharge Follow Up Recommendations for labs/diagnostics:  Will follow up with Dr. Cervantes and Dr. Duque.    Day of Discharge     HPI:   Continues to do well with no further CP.  Wants to go home.    Review of Systems  Gen- No fevers, chills  CV- No chest pain, palpitations  Resp- No cough, dyspnea  GI- No N/V/D, abd pain    Otherwise ROS is negative except as mentioned in the HPI.    Vital Signs:   Temp:  [97.9 °F (36.6 °C)-98.3 °F (36.8 °C)] 97.9 °F (36.6 °C)  Heart Rate:  [62-68] 68  Resp:  [16-18] 18  BP: (100-119)/(55) 100/55     Physical Exam:  Constitutional: No acute distress, awake, alert, age appropriate  HENT: NCAT, mucous membranes moist  Respiratory: Clear to auscultation bilaterally, respiratory effort normal   Cardiovascular: RRR, no murmurs, rubs, or gallops   Gastrointestinal: Positive bowel sounds, soft, nontender, nondistended  Musculoskeletal: No bilateral ankle edema  Psychiatric: Appropriate affect, cooperative  Neurologic: Oriented x 3, no focal deficits  Skin: No rashes  Exam is unchanged from 12/19      Pertinent  and/or Most Recent Results     Results from last 7 days   Lab Units 12/18/19  0557 12/16/19  0500 12/15/19  1158   WBC 10*3/mm3  --  7.57 8.16   HEMOGLOBIN g/dL  --  12.1* 13.8   HEMATOCRIT %  --  36.9* 42.5   PLATELETS 10*3/mm3  --  236 197   SODIUM mmol/L 140 139 141   POTASSIUM mmol/L 4.0 4.0 4.0   CHLORIDE mmol/L 103 107 104   CO2 mmol/L 27.0 23.0 24.0   BUN mg/dL 12 14 15   CREATININE mg/dL 0.82 0.83 1.07   GLUCOSE mg/dL 91 106* 120*   CALCIUM mg/dL 8.9 8.6 9.1     Results from last 7 days   Lab Units 12/15/19  1158   BILIRUBIN mg/dL 0.4   ALK PHOS U/L 130*   ALT (SGPT) U/L 25   AST (SGOT) U/L 48*           Invalid  input(s): TG, LDLCALC, LDLREALC  Results from last 7 days   Lab Units 12/20/19  0509 12/19/19  0410 12/18/19  0557 12/17/19  0326 12/16/19  1028  12/15/19  1300 12/15/19  1158   PROBNP pg/mL  --   --   --   --   --   --   --  204.5   TROPONIN T ng/mL 0.359* 1.250* 2.360* 2.200* 1.100*   < >  --  0.278*   LACTATE mmol/L  --   --   --   --   --   --  1.3  --     < > = values in this interval not displayed.       Brief Urine Lab Results  (Last result in the past 365 days)      Color   Clarity   Blood   Leuk Est   Nitrite   Protein   CREAT   Urine HCG        12/09/19 1247 Dark Yellow Clear Negative Negative Negative Negative               Microbiology Results Abnormal     Procedure Component Value - Date/Time    Blood Culture - Blood, Arm, Right [[id number]] Collected:  12/15/19 1300    Lab Status:  Preliminary result Specimen:  Blood from Arm, Right Updated:  12/19/19 1316     Blood Culture No growth at 4 days    Blood Culture - Blood, Arm, Right [[id number]] Collected:  12/15/19 1300    Lab Status:  Preliminary result Specimen:  Blood from Arm, Right Updated:  12/19/19 1316     Blood Culture No growth at 4 days    Beta Strep Culture, Throat - Swab, Throat [[id number]]  (Normal) Collected:  12/15/19 1210    Lab Status:  Final result Specimen:  Swab from Throat Updated:  12/17/19 1123     Throat Culture, Beta Strep No Beta Hemolytic Streptococcus Isolated    Narrative:       Group A Strep incidence is low in adults. Positive culture for Beta hemolytic Streptococcus species can reflect colonization and not true infection. Please correlate clinically.    Respiratory Panel, PCR - Swab, Nasopharynx [[id number]]  (Normal) Collected:  12/16/19 2000    Lab Status:  Final result Specimen:  Swab from Nasopharynx Updated:  12/16/19 2147     ADENOVIRUS, PCR Not Detected     Coronavirus 229E Not Detected     Coronavirus HKU1 Not Detected     Coronavirus NL63 Not Detected     Coronavirus OC43 Not Detected     Human Metapneumovirus Not  Detected     Human Rhinovirus/Enterovirus Not Detected     Influenza B PCR Not Detected     Parainfluenza Virus 1 Not Detected     Parainfluenza Virus 2 Not Detected     Parainfluenza Virus 3 Not Detected     Parainfluenza Virus 4 Not Detected     Bordetella pertussis pcr Not Detected     Influenza A H1 2009 PCR Not Detected     Chlamydophila pneumoniae PCR Not Detected     Mycoplasma pneumo by PCR Not Detected     Influenza A PCR Not Detected     Influenza A H3 Not Detected     Influenza A H1 Not Detected     RSV, PCR Not Detected     Bordetella parapertussis PCR Not Detected    Rapid Strep A Screen - Swab, Throat [[id number]]  (Normal) Collected:  12/15/19 1210    Lab Status:  Final result Specimen:  Swab from Throat Updated:  12/15/19 1241     Strep A Ag Negative    Narrative:       Test performed by Direct Antigen Testing.          Imaging Results (All)     Procedure Component Value Units Date/Time    MRI Cardiac Function Complete With & Without Morphology [[id number]] Resulted:  12/16/19 1516     Updated:  12/17/19 1410    XR Chest 1 View [[id number]] Collected:  12/15/19 1246     Updated:  12/15/19 1742    Narrative:          EXAMINATION: XR CHEST 1 VW-      INDICATION: Chest Pain triage protocol      COMPARISON: NONE     FINDINGS: There is a normal cardiac silhouette. There is no pulmonary  inflammatory process, mass or effusion.           Impression:       No active disease     This report was finalized on 12/15/2019 5:38 PM by Dr. Henok Rajan MD.                       Results for orders placed during the hospital encounter of 12/15/19   Adult Transthoracic Echo Limited W/ Cont if Necessary Per Protocol    Narrative · This is a LIMITED study for follow-up LV systolic function in a patient   with myocarditis.  · Global and segmental LV wall motion is normal with a calculated LV   ejection fraction = 68%.  · There is a very small (posterior to LV) pericardial effusion.  · Right ventricular systolic function is  normal.           Order Current Status    Blood Culture - Blood, Arm, Right Preliminary result    Blood Culture - Blood, Arm, Right Preliminary result        Discharge Details        Discharge Medications      New Medications      Instructions Start Date   azithromycin 500 MG tablet  Commonly known as:  ZITHROMAX   500 mg, Oral, Daily      naproxen 250 MG tablet  Commonly known as:  NAPROSYN   250 mg, Oral, 2 Times Daily PRN         Stop These Medications    amoxicillin 500 MG capsule  Commonly known as:  AMOXIL     ibuprofen 200 MG tablet  Commonly known as:  ADVIL,MOTRIN            No Known Allergies      Discharge Disposition:  Home or Self Care    Diet:  Hospital:  Diet Order   Procedures   • Diet Regular       Activity:  Activity Instructions     Activity as Tolerated      Activity as Tolerated               CODE STATUS:    Code Status and Medical Interventions:   Ordered at: 12/15/19 6894     Level Of Support Discussed With:    Patient     Code Status:    CPR     Medical Interventions (Level of Support Prior to Arrest):    Full       Future Appointments   Date Time Provider Department Center   1/10/2020  8:30 AM Shayy Aguilar APRN [facility] None   1/28/2020  2:30 PM Fredi Cervantes MD [facility] None       Additional Instructions for the Follow-ups that You Need to Schedule     Discharge Follow-up with Specified Provider: Dr. Cervantes - January 2nd   As directed      To:  Dr. Cervantes - January 2nd    Follow Up Details:  January 2nd         Discharge Follow-up with Specified Provider: Dr. Duque 1 week   As directed      To:  Dr. Duque 1 week         Troponin T    Dec 25, 2019 (Approximate)            Time Spent on Discharge:  25 minutes    Electronically signed by Nathen Guerrero MD, 12/19/19, 8:08 AM.

## 2019-12-20 NOTE — PROGRESS NOTES
"  [facility]  PROGRESS NOTE    Date of Admission: 12/15/2019  Length of Stay: 4  Primary Care Physician: Shayy Aguilar APRN    Chief Complaint: f/u myocarditis   Problem List:   1. Myocarditis   A. Presentation to [facility] ED with acute onset chest pain 12/15/2019  B. EKG consistent with pericarditis, elevated ESR and C-RP  C. Elevated inflammatory marker, troponin, Echo with nl LV, no pericardial rub or effusion.              D. Cardiac MRI c/w myocarditis  E. Limited echo 12/19/19: Normal LVEF 68%, very small (posterior to LV) pericardial effusion, RVSF is normal     2. Sore throat              A. On Amoxicillin for 3 days (at admission).     Subjective      HPI: He is asymptomatic at rest. No recurrent chesty pain or SOA.      Objective   Vitals: /55 (BP Location: Right arm, Patient Position: Lying)   Pulse 68   Temp 97.9 °F (36.6 °C) (Oral)   Resp 18   Ht 182.9 cm (72.01\")   Wt 80.3 kg (177 lb)   SpO2 96%   BMI 24.00 kg/m²     Physical Exam:  GENERAL: Alert, cooperative, in no acute distress.   HEENT: Normocephalic, no jugular venous distention  HEART: No discrete PMI is noted. Regular rhythm, normal rate, and no murmurs, gallops, or rubs.   LUNGS: Clear to auscultation bilaterally. No wheezing, rales or rhonchi.  ABDOMEN: Soft, bowel sounds present, non-tender   NEUROLOGIC: No focal abnormalities involving strength or sensation are noted.   EXTREMITIES: No clubbing, cyanosis, or edema noted.     Results:  Results from last 7 days   Lab Units 12/16/19  0500 12/15/19  1158   WBC 10*3/mm3 7.57 8.16   HEMOGLOBIN g/dL 12.1* 13.8   HEMATOCRIT % 36.9* 42.5   PLATELETS 10*3/mm3 236 197     Results from last 7 days   Lab Units 12/18/19  0557 12/16/19  0500 12/15/19  1158   SODIUM mmol/L 140 139 141   POTASSIUM mmol/L 4.0 4.0 4.0   CHLORIDE mmol/L 103 107 104   CO2 mmol/L 27.0 23.0 24.0   BUN mg/dL 12 14 15   CREATININE mg/dL 0.82 0.83 1.07   GLUCOSE mg/dL 91 106* 120* "      Lab Results   Component Value Date    AST 48 (H) 12/15/2019    ALT 25 12/15/2019       Results from last 7 days   Lab Units 12/20/19  0509 12/19/19  0410 12/18/19  0557  12/15/19  1158   CK TOTAL U/L  --   --   --   --  139   TROPONIN T ng/mL 0.359* 1.250* 2.360*   < > 0.278*    < > = values in this interval not displayed.     Results from last 7 days   Lab Units 12/15/19  1158   PROBNP pg/mL 204.5       Intake/Output Summary (Last 24 hours) at 12/20/2019 1001  Last data filed at 12/20/2019 0900  Gross per 24 hour   Intake 720 ml   Output 320 ml   Net 400 ml     I personally reviewed the patient's EKG/Telemetry data    Radiology Data:   Echo 12/19/19:  Interpretation Summary     · This is a LIMITED study for follow-up LV systolic function in a patient with myocarditis.  · Global and segmental LV wall motion is normal with a calculated LV ejection fraction = 68%.  · There is a very small (posterior to LV) pericardial effusion.  · Right ventricular systolic function is normal.        Current Medications:    azithromycin 500 mg Oral Q24H   Morphine 4 mg Intravenous Once   naproxen 250 mg Oral BID With Meals   sodium chloride 10 mL Intravenous Q12H          Assessment and Plan:     His myocarditis is resolving with a further fall in Troponin T, resolution of chest pain, and preserved (normal) LV systolic functiuon by echo 12/19/19. He is instructed to call for any recurrent symptoms  and to not use alcohol. He is instructed to be sedentary. I will follow-up 1/2/20 with troponin T and EKG.  Talked and all questions answered.

## 2019-12-20 NOTE — PLAN OF CARE
VSS overnight on room air.  Pt slept well and without reports of chest pain or complaints throughout night.  Troponin obtained and pending.    No acute distress or complaints to note at this time.

## 2019-12-20 NOTE — PROGRESS NOTES
"Adult Nutrition  Assessment/PES    Patient Name:  Vamshi August  YOB: 2000  MRN: [id number]  Admit Date:  12/15/2019    Assessment Date:  12/20/2019    Comments:      Reason for Assessment     Row Name 12/20/19 0803          Reason for Assessment    Reason For Assessment  other (see comments) LOS/15\"                   Nutrition Prescription Ordered     Row Name 12/20/19 0803          Nutrition Prescription PO    Current PO Diet  Regular                 Problem/Interventions:  Problem 1     Row Name 12/20/19 0803          Nutrition Diagnoses Problem 1    Problem 1  Nutrition Appropriate for Condition at this Time     Signs/Symptoms (evidenced by)  PO Intake     Percent (%) intake recorded  92 %     Over number of meals  3                     Education/Evaluation     Row Name 12/20/19 0803          Monitor/Evaluation    Monitor  Per protocol           Electronically signed by:  Clover Sutherland RD  12/20/19 8:03 AM  "

## 2019-12-20 NOTE — PROGRESS NOTES
Continued Stay Note  [facility]     Patient Name: Vamshi August  MRN: [id number]  Today's Date: 12/20/2019    Admit Date: 12/15/2019    Discharge Plan     Row Name 12/20/19 0902       Plan    Plan  Home    Patient/Family in Agreement with Plan  yes    Plan Comments  Spoke with patient in room.  His plan is still to return home at discharge.  He denies any needs at this time.  CM will continue to follow.  Babita Bella RN [phone]    Final Discharge Disposition Code  01 - home or self-care        Discharge Codes    No documentation.       Expected Discharge Date and Time     Expected Discharge Date Expected Discharge Time    Dec 20, 2019             Babita Bella RN

## 2019-12-21 ENCOUNTER — READMISSION MANAGEMENT (OUTPATIENT)
Dept: CALL CENTER | Facility: HOSPITAL | Age: 19
End: 2019-12-21

## 2019-12-21 NOTE — OUTREACH NOTE
Prep Survey      Responses   Facility patient discharged from?  [facility]   Is patient eligible?  Yes   Discharge diagnosis  Myocarditis   Does the patient have one of the following disease processes/diagnoses(primary or secondary)?  Other   Does the patient have Home health ordered?  No   Is there a DME ordered?  No   Prep survey completed?  Yes          Jacqueline Sales RN

## 2019-12-26 ENCOUNTER — READMISSION MANAGEMENT (OUTPATIENT)
Dept: CALL CENTER | Facility: HOSPITAL | Age: 19
End: 2019-12-26

## 2019-12-26 NOTE — OUTREACH NOTE
Medical Week 1 Survey      Responses   Facility patient discharged from?  [facility]   Does the patient have one of the following disease processes/diagnoses(primary or secondary)?  Other   Is there a successful TCM telephone encounter documented?  No   Week 1 attempt successful?  No   Unsuccessful attempts  Attempt 1          Veronica Moreland RN

## 2019-12-30 ENCOUNTER — READMISSION MANAGEMENT (OUTPATIENT)
Dept: CALL CENTER | Facility: HOSPITAL | Age: 19
End: 2019-12-30

## 2019-12-30 NOTE — OUTREACH NOTE
Medical Week 1 Survey      Responses   Facility patient discharged from?  [facility]   Does the patient have one of the following disease processes/diagnoses(primary or secondary)?  Other   Is there a successful TCM telephone encounter documented?  No   Week 1 attempt successful?  No   Unsuccessful attempts  Attempt 2          Michela Cervantes RN

## 2020-01-02 ENCOUNTER — LAB (OUTPATIENT)
Dept: LAB | Facility: HOSPITAL | Age: 20
End: 2020-01-02

## 2020-01-02 ENCOUNTER — CLINICAL SUPPORT (OUTPATIENT)
Dept: CARDIOLOGY | Facility: CLINIC | Age: 20
End: 2020-01-02

## 2020-01-02 DIAGNOSIS — I40.9 ACUTE MYOCARDITIS, UNSPECIFIED MYOCARDITIS TYPE: Primary | ICD-10-CM

## 2020-01-02 DIAGNOSIS — I40.9 ACUTE MYOCARDITIS, UNSPECIFIED MYOCARDITIS TYPE: ICD-10-CM

## 2020-01-03 ENCOUNTER — TELEPHONE (OUTPATIENT)
Dept: CARDIOLOGY | Facility: CLINIC | Age: 20
End: 2020-01-03

## 2020-01-03 ENCOUNTER — LAB (OUTPATIENT)
Dept: LAB | Facility: HOSPITAL | Age: 20
End: 2020-01-03

## 2020-01-03 DIAGNOSIS — I40.9 ACUTE MYOCARDITIS, UNSPECIFIED MYOCARDITIS TYPE: ICD-10-CM

## 2020-01-03 LAB — TROPONIN T SERPL-MCNC: <0.01 NG/ML (ref 0–0.03)

## 2020-01-03 PROCEDURE — 84484 ASSAY OF TROPONIN QUANT: CPT

## 2020-01-03 PROCEDURE — 36415 COLL VENOUS BLD VENIPUNCTURE: CPT

## 2020-01-03 NOTE — TELEPHONE ENCOUNTER
"Pt was seen briefly by [facility] yesterday for EKG, troponin, and assessment.  He has remained asymptomatic since discharge and continues Naproxen 250 mg BID. He plans a trip to [place] with two friends and a physician (parent of one of his friends). He is leaving on 1/5/2020.    EKG reviewed and signed by J showed evolution of inferolateral STTWA since 12/18/19. Troponin was \"lost\" by lab 1/2/2020. Patient was called and troponin drawn stat this AM. Troponin normal.    Per [facility] pt is cleared to travel but was instructed to remain sedentary and not participate in any strenuous exercise or activities. No snorkeling, scuba diving, hiking, bike riding, etc. Pt understands these recommendations. We will see him back in 4-6 weeks for another formal follow up.   Catherine Juarez RN    "

## 2020-01-06 ENCOUNTER — READMISSION MANAGEMENT (OUTPATIENT)
Dept: CALL CENTER | Facility: HOSPITAL | Age: 20
End: 2020-01-06

## 2020-01-06 NOTE — OUTREACH NOTE
Medical Week 3 Survey      Responses   Facility patient discharged from?  [facility]   Does the patient have one of the following disease processes/diagnoses(primary or secondary)?  Other   Week 3 attempt successful?  No   Unsuccessful attempts  Attempt 1          Mary Ann Lemus RN

## 2020-01-08 ENCOUNTER — READMISSION MANAGEMENT (OUTPATIENT)
Dept: CALL CENTER | Facility: HOSPITAL | Age: 20
End: 2020-01-08

## 2020-01-08 NOTE — OUTREACH NOTE
Medical Week 3 Survey      Responses   Facility patient discharged from?  [facility] [place] [facility]   Does the patient have one of the following disease processes/diagnoses(primary or secondary)?  Other   Week 3 attempt successful?  No   Unsuccessful attempts  Attempt 2          Michela Cervantes RN

## 2020-02-18 ENCOUNTER — OFFICE VISIT (OUTPATIENT)
Dept: CARDIOLOGY | Facility: CLINIC | Age: 20
End: 2020-02-18

## 2020-02-18 VITALS
BODY MASS INDEX: 24.38 KG/M2 | SYSTOLIC BLOOD PRESSURE: 129 MMHG | WEIGHT: 180 LBS | HEIGHT: 72 IN | DIASTOLIC BLOOD PRESSURE: 82 MMHG | HEART RATE: 59 BPM

## 2020-02-18 DIAGNOSIS — I40.9 ACUTE MYOCARDITIS, UNSPECIFIED MYOCARDITIS TYPE: Primary | ICD-10-CM

## 2020-02-18 PROCEDURE — 93000 ELECTROCARDIOGRAM COMPLETE: CPT | Performed by: INTERNAL MEDICINE

## 2020-02-18 PROCEDURE — 99212 OFFICE O/P EST SF 10 MIN: CPT | Performed by: INTERNAL MEDICINE

## 2020-02-18 NOTE — PROGRESS NOTES
"  OFFICE FOLLOW UP     Date of Encounter:2020     Name: Vamshi August  : 2000  Address: [address] [address]    PCP: Shayy Aguilar, SUSHMA  [address] [place] [address]    Vamshi August is a 19 y.o. male.      Chief Complaint: Follow up of Myocarditis with EKG    Problem List:   1. Myocarditis   A. Presentation to [facility] ED with acute onset chest pain 12/15/2019  B. EKG consistent with pericarditis, elevated ESR and C-RP  C. Elevated inflammatory marker, troponin, Echo with nl LV, no pericardial rub or effusion.              D. Cardiac MRI c/w myocarditis  E. Limited echo 19: Normal LVEF 68%, very small (posterior to LV) pericardial effusion, RVSF is normal     2. Sore throat              A. On Amoxicillin for 3 days (at admission).    Allergies:  No Known Allergies    Current Medications:  Naproxen OTC PRN    History of Present Illness:       Vamshi August returns for follow up today with EKG. He has not had any chest pain since December. EKG repeated today. He is completing last semester finals that he missed in December. He has not had shortness of breath, dizziness, palpitations or syncope.      The following portions of the patient's history were reviewed and updated as appropriate: allergies, current medications and problem list.    ROS: Pertinent positives as listed in the HPI.  All other systems reviewed and negative.    Objective:    Vitals:    20 1542 20 1547   BP: 132/80 129/82   BP Location: Right arm Right arm   Patient Position: Sitting Standing   Pulse: 59 59   Weight: 81.6 kg (180 lb) 81.6 kg (180 lb)   Height: 182.9 cm (72\") 182.9 cm (72\")       Physical Exam:  GENERAL: Alert, cooperative, in no acute distress.   HEENT: Normocephalic, no adenopathy, no jugular venous distention  HEART: No discrete PMI is noted. Regular rhythm, normal rate, and no murmurs, gallops, or rubs.   LUNGS: Clear to " auscultation bilaterally. No wheezing, rales or ronchi.  ABDOMEN: Soft, bowel sounds present, non-tender   NEUROLOGIC: No focal abnormalities involving strength or sensation are noted.   EXTREMITIES: No clubbing, cyanosis, or edema noted.       Diagnostic Data:  No new labs available to review.       ECG 12 Lead  Date/Time: 2/18/2020 4:38 PM  Performed by: Fredi Cervantes MD  Authorized by: Fredi Cervantes MD   Comparison: compared with previous ECG from 1/2/2020  Similar to previous ECG  Comparison to previous ECG: ST-T wave  Rhythm: sinus rhythm  Other findings: non-specific ST-T wave changes    Clinical impression: abnormal EKG              Assessment and Plan:   1.  Myocarditis: Resolving. ST-T wave changes have essentially resolved except for minor NS changes. He has been asymptomatic. We will see him back on an as needed should further symptoms arise.   Scribed for Fredi Cervantes MD by Catherine Juarez RN. 02/18/2020 4:40 PM.       EMR Dragon/Transcription Disclaimer:  Much of this encounter note is an electronic transcription/translation of spoken language to printed text.  The electronic translation of spoken language may permit erroneous, or at times, nonsensical words or phrases to be inadvertently transcribed.  Although I have reviewed the note for such errors, some may still exist.

## 2020-04-07 ENCOUNTER — APPOINTMENT (OUTPATIENT)
Dept: ULTRASOUND IMAGING | Facility: HOSPITAL | Age: 20
End: 2020-04-07

## 2020-04-07 ENCOUNTER — HOSPITAL ENCOUNTER (EMERGENCY)
Facility: HOSPITAL | Age: 20
Discharge: HOME | End: 2020-04-07
Attending: EMERGENCY MEDICINE | Admitting: EMERGENCY MEDICINE

## 2020-04-07 VITALS
HEART RATE: 95 BPM | DIASTOLIC BLOOD PRESSURE: 84 MMHG | SYSTOLIC BLOOD PRESSURE: 137 MMHG | BODY MASS INDEX: 23.7 KG/M2 | RESPIRATION RATE: 18 BRPM | WEIGHT: 175 LBS | HEIGHT: 72 IN | OXYGEN SATURATION: 97 % | TEMPERATURE: 98.5 F

## 2020-04-07 DIAGNOSIS — N50.812 TESTICULAR PAIN, LEFT: Primary | ICD-10-CM

## 2020-04-07 DIAGNOSIS — N43.3 HYDROCELE IN ADULT: ICD-10-CM

## 2020-04-07 LAB
BILIRUB UR QL STRIP: NEGATIVE
CLARITY UR: CLEAR
COLOR UR: YELLOW
GLUCOSE UR STRIP-MCNC: NEGATIVE MG/DL
HGB UR QL STRIP.AUTO: NEGATIVE
KETONES UR QL STRIP: NEGATIVE
LEUKOCYTE ESTERASE UR QL STRIP.AUTO: NEGATIVE
NITRITE UR QL STRIP: NEGATIVE
PH UR STRIP.AUTO: 8 [PH] (ref 5–8)
PROT UR QL STRIP: NEGATIVE
SP GR UR STRIP: 1.01 (ref 1–1.03)
UROBILINOGEN UR QL STRIP: NORMAL

## 2020-04-07 PROCEDURE — 99282 EMERGENCY DEPT VISIT SF MDM: CPT

## 2020-04-07 PROCEDURE — 76870 US EXAM SCROTUM: CPT

## 2020-04-07 PROCEDURE — 81003 URINALYSIS AUTO W/O SCOPE: CPT | Performed by: PHYSICIAN ASSISTANT

## 2020-04-07 PROCEDURE — 93976 VASCULAR STUDY: CPT

## 2020-04-07 PROCEDURE — 87591 N.GONORRHOEAE DNA AMP PROB: CPT | Performed by: PHYSICIAN ASSISTANT

## 2020-04-07 PROCEDURE — 87491 CHLMYD TRACH DNA AMP PROBE: CPT | Performed by: PHYSICIAN ASSISTANT

## 2020-04-07 PROCEDURE — 99283 EMERGENCY DEPT VISIT LOW MDM: CPT

## 2020-04-07 NOTE — ED PROVIDER NOTES
"Subjective   Mr. Vamshi August is a 19 y.o. male who presents to the ED with c/o testicle pain. He reports 1 hour ago while studying for a test he began experiencing a sensation in his left testicle which felt like it was retracting. He felt his testicles at that time and states they felt normal. He states he now has a \"weird\" sensation on his entire left side. He has a history of spermatocele and hydrocele and has had penile discharge with that in the past. He has had dysuria and penile discharge intermittently over the past 2 weeks. He denies suspicion of STD and was tested 1 month ago. He denies flank pain or hematuria. He notes he had myocarditis 1 year ago and was treated by Dr. Cervantes and his EKG and echocardiogram returned to normal. There are no other acute symptoms at this time.      History provided by:  Patient  Testicle Pain   Location:  Left testicle  Severity:  Moderate  Onset quality:  Sudden  Duration:  1 hour  Timing:  Constant  Progression:  Unchanged  Chronicity:  New  Relieved by:  None tried  Worsened by:  Nothing  Ineffective treatments:  None tried      Review of Systems   Genitourinary: Positive for discharge, dysuria and testicular pain. Negative for flank pain and hematuria.        Positive for testicle pain.   All other systems reviewed and are negative.      Past Medical History:   Diagnosis Date   • Known health problems: none    • Myocarditis (CMS/HCC)    • Spermatocele        No Known Allergies    Past Surgical History:   Procedure Laterality Date   • DENTAL PROCEDURE     • WISDOM TOOTH EXTRACTION         Family History   Problem Relation Age of Onset   • No Known Problems Mother    • No Known Problems Father    • No Known Problems Sister    • No Known Problems Brother        Social History     Socioeconomic History   • Marital status: Single     Spouse name: Not on file   • Number of children: Not on file   • Years of education: Not on file   • Highest education level: " Not on file   Tobacco Use   • Smoking status: Former Smoker     Types: Electronic Cigarette   • Smokeless tobacco: Never Used   Substance and Sexual Activity   • Alcohol use: Yes     Frequency: 2-4 times a month     Drinks per session: 3 or 4     Binge frequency: Never     Comment: once a week   • Drug use: No   • Sexual activity: Defer     Partners: Female   Social History Narrative    Current college student, lives in house with 4 roommates.          Objective   Physical Exam   Constitutional: He is oriented to person, place, and time. He appears well-developed and well-nourished. No distress.   HENT:   Head: Normocephalic and atraumatic.   Nose: Nose normal.   Eyes: Conjunctivae are normal. No scleral icterus.   Neck: Normal range of motion. Neck supple.   Cardiovascular: Normal rate, regular rhythm and normal heart sounds.   No murmur heard.  Pulmonary/Chest: Effort normal and breath sounds normal. No respiratory distress.   Abdominal: Soft. There is no tenderness.   Genitourinary:   Genitourinary Comments: Normal descended testicles. No mass, tenderness, or hernias palpable.   Musculoskeletal: Normal range of motion. He exhibits no edema.   Neurological: He is alert and oriented to person, place, and time.   Skin: Skin is warm and dry.   Psychiatric: He has a normal mood and affect. His behavior is normal.   Nursing note and vitals reviewed.      Procedures         ED Course     Recent Results (from the past 24 hour(s))   Urinalysis With Microscopic If Indicated (No Culture) - Urine, Clean Catch    Collection Time: 04/07/20  5:14 PM   Result Value Ref Range    Color, UA Yellow Yellow, Straw    Appearance, UA Clear Clear    pH, UA 8.0 5.0 - 8.0    Specific Gravity, UA 1.007 1.001 - 1.030    Glucose, UA Negative Negative    Ketones, UA Negative Negative    Bilirubin, UA Negative Negative    Blood, UA Negative Negative    Protein, UA Negative Negative    Leuk Esterase, UA Negative Negative    Nitrite, UA Negative  Negative    Urobilinogen, UA 0.2 E.U./dL 0.2 - 1.0 E.U./dL     Note: In addition to lab results from this visit, the labs listed above may include labs taken at another facility or during a different encounter within the last 24 hours. Please correlate lab times with ED admission and discharge times for further clarification of the services performed during this visit.    US Scrotum & Testicles   Preliminary Result   1. Testicles appear within normal limits. No evidence of torsion.        2.  Right and left epididymis appear unremarkable except for a minute   left-sided spermatocele of doubtful significance.       3. Scrotal fluid appears a little increased, and could be considered   small hydroceles. No hyperemia to suggest significant ongoing   inflammation is identified. Consider follow-up if patient's symptoms   persist or worsen.       D:  04/07/2020   E:  04/07/2020          US Testicular or Ovarian Vascular Limited   Preliminary Result   1. Testicles appear within normal limits. No evidence of torsion.        2.  Right and left epididymis appear unremarkable except for a minute   left-sided spermatocele of doubtful significance.       3. Scrotal fluid appears a little increased, and could be considered   small hydroceles. No hyperemia to suggest significant ongoing   inflammation is identified. Consider follow-up if patient's symptoms   persist or worsen.       D:  04/07/2020   E:  04/07/2020            Vitals:    04/07/20 1600 04/07/20 1714 04/07/20 1716 04/07/20 1718   BP: 147/63 137/84     BP Location:       Patient Position:       Pulse:    95   Resp:       Temp:       TempSrc:       SpO2: 94%  97%    Weight:       Height:         Medications - No data to display  ECG/EMG Results (last 24 hours)     ** No results found for the last 24 hours. **        No orders to display                                              MDM  Number of Diagnoses or Management Options  Hydrocele in adult: new and requires  workup  Testicular pain, left: new and requires workup     Amount and/or Complexity of Data Reviewed  Clinical lab tests: reviewed and ordered  Tests in the radiology section of CPT®: ordered and reviewed  Tests in the medicine section of CPT®: ordered and reviewed  Discuss the patient with other providers: yes    Patient Progress  Patient progress: stable      Final diagnoses:   Testicular pain, left   Hydrocele in adult       Documentation assistance provided by dadyay Worthington.  Information recorded by the scribe was done at my direction and has been verified and validated by me.     Kelvin Worthington  04/07/20 1643       Joe Mckenzie PA  04/07/20 [id number]

## 2020-04-09 LAB
C TRACH RRNA SPEC DONR QL NAA+PROBE: POSITIVE
N GONORRHOEA DNA SPEC QL NAA+PROBE: NEGATIVE

## 2020-04-10 ENCOUNTER — TELEPHONE (OUTPATIENT)
Dept: EMERGENCY DEPT | Facility: HOSPITAL | Age: 20
End: 2020-04-10

## 2020-06-11 ENCOUNTER — OFFICE VISIT (OUTPATIENT)
Dept: UROLOGY | Facility: CLINIC | Age: 20
End: 2020-06-11

## 2020-06-11 VITALS
SYSTOLIC BLOOD PRESSURE: 128 MMHG | WEIGHT: 175 LBS | TEMPERATURE: 97.6 F | BODY MASS INDEX: 23.7 KG/M2 | RESPIRATION RATE: 18 BRPM | DIASTOLIC BLOOD PRESSURE: 86 MMHG | OXYGEN SATURATION: 98 % | HEIGHT: 72 IN | HEART RATE: 70 BPM

## 2020-06-11 DIAGNOSIS — N50.82 SCROTAL PAIN: Primary | ICD-10-CM

## 2020-06-11 PROCEDURE — 99243 OFF/OP CNSLTJ NEW/EST LOW 30: CPT | Performed by: UROLOGY

## 2020-06-11 NOTE — PROGRESS NOTES
Chief Complaint  Left testicular pain    Referring Provider  Shayy Aguilar APRN HPI  Mr. August is a 19 y.o. male with history of  who presents with intermittent and chronic left testicular pain x 4-6 months.    Quality:    Dull, almost a heavyness  Provocative factors:  Activity does not makes it worse  Palliative factors:   Rest does not makes it better  Radiation:   Along   Severity:   0/10 currently and 3/10 at its worst  Previous treatments: none    no Current LUTS  no History of vasectomy  no History of kidney stones, recent trauma or injury. No fam Hx of stones. Hx of football   no History of constipation    He does have interest in future fertility.      Past Medical History  Past Medical History:   Diagnosis Date   • Known health problems: none    • Myocarditis (CMS/HCC)    • Spermatocele        Past Surgical History  Past Surgical History:   Procedure Laterality Date   • DENTAL PROCEDURE     • WISDOM TOOTH EXTRACTION         Medications    Current Outpatient Medications:   •  diclofenac (VOLTAREN) 50 MG EC tablet, Take 1 tablet by mouth 3 (Three) Times a Day., Disp: 15 tablet, Rfl: 0  •  oseltamivir (TAMIFLU) 75 MG capsule, Take 1 capsule by mouth 2 (Two) Times a Day., Disp: 10 capsule, Rfl: 0    Allergies  No Known Allergies    Social History  Social History     Socioeconomic History   • Marital status: Single     Spouse name: Not on file   • Number of children: Not on file   • Years of education: Not on file   • Highest education level: Not on file   Tobacco Use   • Smoking status: Former Smoker     Types: Electronic Cigarette   • Smokeless tobacco: Never Used   Substance and Sexual Activity   • Alcohol use: Yes     Frequency: 2-4 times a month     Drinks per session: 3 or 4     Binge frequency: Never     Comment: once a week   • Drug use: No   • Sexual activity: Defer     Partners: Female   Social History Narrative    Current college student, lives in house with 4 roommates.        Family  "History  He has no family history of kidney stones    Review of Systems  Constitutional: No fevers or chills  Skin: Negative for rash  Endocrine: No heat/cold intolerance   Cardiovascular: Negative for chest pain or dyspnea on exertion  Respiratory: Negative for shortness of breath or wheezing  Gastrointestinal: No nausea or vomiting  Genitourinary: Negative for new lower urinary tract symptoms, current gross hematuria or dysuria.  Musculoskeletal: No flank pain  Neurological:  Negative for frequent headaches or dizziness  Lymph/Heme: Negative for leg swelling or calf pain.    Physical Exam  Visit Vitals  /86   Pulse 70   Temp 97.6 °F (36.4 °C)   Resp 18   Ht 182.9 cm (72\")   Wt 79.4 kg (175 lb)   SpO2 98%   BMI 23.73 kg/m²     Constitutional: NAD, WDWN.   HEENT: NCAT. Conjunctivae normal.  MMM.    Cardiovascular: Regular rate.  Pulmonary/Chest: Respirations are even and non-labored bilaterally.  Abdominal: Soft. No distension, tenderness, masses or guarding. No CVA tenderness.  Neurological: A + O x 3.  Cranial Nerves II-XII grossly intact. Normal gait.  Extremities: CLARENCE x 4, Warm. No clubbing.  No cyanosis.    Skin: Pink, warm and dry.  No rashes noted.  Psychiatric:  Normal mood and affect    Genitourinary  Penis: circumcised penis, glans normal, no penile discharge.  No rashes/lesions.    Testes: descended bilaterally, no masses, LEFT epididymis nontender to palpation,  RIGHT epididymis nontender to palpation. No varicocele palpated. No hernia appreciated.      Labs  Brief Urine Lab Results  (Last result in the past 365 days)      Color   Clarity   Blood   Leuk Est   Nitrite   Protein   CREAT   Urine HCG        04/07/20 1714 Yellow Clear Negative Negative Negative Negative               Lab Results   Component Value Date    GLUCOSE 91 12/18/2019    CALCIUM 8.9 12/18/2019     12/18/2019    K 4.0 12/18/2019    CO2 27.0 12/18/2019     12/18/2019    BUN 12 12/18/2019    CREATININE 0.82 12/18/2019 "    EGFRIFNONA 120 12/18/2019    BCR 14.6 12/18/2019    ANIONGAP 10.0 12/18/2019       Radiologic Studies  Us Scrotum & Testicles    Result Date: 4/8/2020  1. Testicles appear within normal limits. No evidence of torsion.  2.  Right and left epididymis appear unremarkable except for a minute left-sided spermatocele of doubtful significance.  3. Scrotal fluid appears a little increased, and could be considered small hydroceles. No hyperemia to suggest significant ongoing inflammation is identified. Consider follow-up if patient's symptoms persist or worsen.  D:  04/07/2020 E:  04/07/2020  This report was finalized on 4/8/2020 10:37 AM by Dr. Chadwick Dixon MD.      Us Testicular Or Ovarian Vascular Limited    Result Date: 4/8/2020  1. Testicles appear within normal limits. No evidence of torsion.  2.  Right and left epididymis appear unremarkable except for a minute left-sided spermatocele of doubtful significance.  3. Scrotal fluid appears a little increased, and could be considered small hydroceles. No hyperemia to suggest significant ongoing inflammation is identified. Consider follow-up if patient's symptoms persist or worsen.  D:  04/07/2020 E:  04/07/2020  This report was finalized on 4/8/2020 10:37 AM by Dr. Chadwick Dixon MD.          Assessment  Mr. August is a 19 y.o. male who presents with intermittent mild left testicular pain.    We discussed different strategies for management including conservative therapy, pain medications (gabapentin, nortriptyline) and surgical interventions (cord denervation).    Plan  1.  Scrotal support continuously, especially with increased activity.  2.  Warm bath soaks twice daily.  3.  NSAIDs for pain as instructed with food.  4.  FU PRN    No follow-ups on file.    Morgan Noriega MD

## 2020-07-08 NOTE — PROGRESS NOTES
"  OFFICE FOLLOW UP     Date of Encounter:2020     Name: Vamshi August  : 2000  Address: [address] [address]    PCP: Shayy Aguilar, SUSHMA  [id number] [place] [address]    Vamshi August is a 19 y.o. male.    Chief Complaint: Follow up of myocarditis     Problem List:   1. Myocarditis   A. Presentation to [facility] ED with acute onset chest pain 12/15/2019  B. EKG consistent with pericarditis, elevated ESR and C-RP  C. Elevated inflammatory marker, troponin, Echo with nl LV, no pericardial rub or effusion.              D. Cardiac MRI c/w myocarditis  E. Limited echo 19: Normal LVEF 68%, very small (posterior to LV) pericardial effusion, RVSF is normal        Allergies:  No Known Allergies    Current Medications: None    History of Present Illness: This young man returns today (at his request) \"just to make sure everything is still all right\".  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  He has not had any shortness of breath or chest pain of any kind.  He is working full-time prior to planned return to school.            The following portions of the patient's history were reviewed and updated as appropriate: allergies, current medications and problem list.    ROS: Pertinent positives as listed in the HPI.  All other systems reviewed and negative.    Objective:  Vitals:    20 1302 20 1306 20 1307   BP: 124/72 126/68 132/76   BP Location: Left arm Right arm Right arm   Patient Position: Sitting Sitting Standing   Pulse: 67 67 72   Temp: 98.6 °F (37 °C)     SpO2: 99%     Weight: 84.7 kg (186 lb 12.8 oz)     Height: 182.9 cm (72\")         Physical Exam:  GENERAL: Alert, cooperative, in no acute distress.   HEENT: Normocephalic, no adenopathy, no jugular venous distention  HEART: No discrete PMI is noted. Regular rhythm, normal rate, and no murmurs, gallops, or rubs.   LUNGS: " Clear to auscultation bilaterally. No wheezing, rales or ronchi.  ABDOMEN: Soft, bowel sounds present, non-tender   NEUROLOGIC: No focal abnormalities involving strength or sensation are noted.   EXTREMITIES: No clubbing, cyanosis, or edema noted.     Diagnostic Data:      Procedures      Assessment and Plan:     1. Myocarditis: resolved. No further evaluation or treatment at this time. Come back to see us on an as needed basis.           EMR Dragon/Transcription Disclaimer:  Much of this encounter note is an electronic transcription/translation of spoken language to printed text.  The electronic translation of spoken language may permit erroneous, or at times, nonsensical words or phrases to be inadvertently transcribed.  Although I have reviewed the note for such errors, some may still exist.

## 2020-07-09 ENCOUNTER — OFFICE VISIT (OUTPATIENT)
Dept: CARDIOLOGY | Facility: CLINIC | Age: 20
End: 2020-07-09

## 2020-07-09 VITALS
HEART RATE: 72 BPM | HEIGHT: 72 IN | OXYGEN SATURATION: 99 % | WEIGHT: 186.8 LBS | TEMPERATURE: 98.6 F | BODY MASS INDEX: 25.3 KG/M2 | DIASTOLIC BLOOD PRESSURE: 76 MMHG | SYSTOLIC BLOOD PRESSURE: 132 MMHG

## 2020-07-09 DIAGNOSIS — I51.4 MYOCARDITIS, UNSPECIFIED CHRONICITY, UNSPECIFIED MYOCARDITIS TYPE (HCC): Primary | ICD-10-CM

## 2020-07-09 PROCEDURE — 99212 OFFICE O/P EST SF 10 MIN: CPT | Performed by: INTERNAL MEDICINE

## 2020-12-29 PROCEDURE — 87661 TRICHOMONAS VAGINALIS AMPLIF: CPT | Performed by: PERSONAL EMERGENCY RESPONSE ATTENDANT

## 2020-12-29 PROCEDURE — 87591 N.GONORRHOEAE DNA AMP PROB: CPT | Performed by: PERSONAL EMERGENCY RESPONSE ATTENDANT

## 2020-12-29 PROCEDURE — 87491 CHLMYD TRACH DNA AMP PROBE: CPT | Performed by: PERSONAL EMERGENCY RESPONSE ATTENDANT
